# Patient Record
Sex: FEMALE | Race: WHITE | NOT HISPANIC OR LATINO | URBAN - METROPOLITAN AREA
[De-identification: names, ages, dates, MRNs, and addresses within clinical notes are randomized per-mention and may not be internally consistent; named-entity substitution may affect disease eponyms.]

---

## 2023-04-07 ENCOUNTER — APPOINTMENT (OUTPATIENT)
Dept: PRIMARY CARE | Facility: CLINIC | Age: 58
End: 2023-04-07
Payer: COMMERCIAL

## 2023-04-07 PROBLEM — N63.20 LEFT BREAST MASS: Status: ACTIVE | Noted: 2023-04-07

## 2023-04-07 PROBLEM — J45.909 ASTHMA (HHS-HCC): Status: ACTIVE | Noted: 2023-04-07

## 2023-04-07 PROBLEM — M54.9 BACK PAIN: Status: ACTIVE | Noted: 2023-04-07

## 2023-04-07 RX ORDER — CYCLOBENZAPRINE HCL 5 MG
5 TABLET ORAL NIGHTLY PRN
COMMUNITY
Start: 2022-12-21 | End: 2023-12-22 | Stop reason: SDUPTHER

## 2023-04-28 ENCOUNTER — CLINICAL SUPPORT (OUTPATIENT)
Dept: PRIMARY CARE | Facility: CLINIC | Age: 58
End: 2023-04-28
Payer: COMMERCIAL

## 2023-04-28 DIAGNOSIS — Z23 IMMUNIZATION DUE: ICD-10-CM

## 2023-04-28 PROCEDURE — 90750 HZV VACC RECOMBINANT IM: CPT | Performed by: INTERNAL MEDICINE

## 2023-04-28 PROCEDURE — 90471 IMMUNIZATION ADMIN: CPT | Performed by: INTERNAL MEDICINE

## 2023-12-22 ENCOUNTER — OFFICE VISIT (OUTPATIENT)
Dept: PRIMARY CARE | Facility: CLINIC | Age: 58
End: 2023-12-22
Payer: COMMERCIAL

## 2023-12-22 VITALS
WEIGHT: 170 LBS | OXYGEN SATURATION: 100 % | DIASTOLIC BLOOD PRESSURE: 72 MMHG | TEMPERATURE: 97.8 F | BODY MASS INDEX: 28.32 KG/M2 | SYSTOLIC BLOOD PRESSURE: 106 MMHG | HEART RATE: 68 BPM | HEIGHT: 65 IN | RESPIRATION RATE: 14 BRPM

## 2023-12-22 DIAGNOSIS — F43.9 STRESS: ICD-10-CM

## 2023-12-22 DIAGNOSIS — M54.50 CHRONIC LOW BACK PAIN, UNSPECIFIED BACK PAIN LATERALITY, UNSPECIFIED WHETHER SCIATICA PRESENT: Primary | ICD-10-CM

## 2023-12-22 DIAGNOSIS — Z01.419 WELL FEMALE EXAM WITH ROUTINE GYNECOLOGICAL EXAM: ICD-10-CM

## 2023-12-22 DIAGNOSIS — G89.29 CHRONIC LOW BACK PAIN, UNSPECIFIED BACK PAIN LATERALITY, UNSPECIFIED WHETHER SCIATICA PRESENT: Primary | ICD-10-CM

## 2023-12-22 DIAGNOSIS — Z12.31 BREAST CANCER SCREENING BY MAMMOGRAM: ICD-10-CM

## 2023-12-22 DIAGNOSIS — J45.20 MILD INTERMITTENT ASTHMA WITHOUT COMPLICATION (HHS-HCC): ICD-10-CM

## 2023-12-22 DIAGNOSIS — Z00.00 HEALTHCARE MAINTENANCE: ICD-10-CM

## 2023-12-22 DIAGNOSIS — Z12.11 COLON CANCER SCREENING: ICD-10-CM

## 2023-12-22 PROCEDURE — 1036F TOBACCO NON-USER: CPT | Performed by: INTERNAL MEDICINE

## 2023-12-22 PROCEDURE — 99396 PREV VISIT EST AGE 40-64: CPT | Performed by: INTERNAL MEDICINE

## 2023-12-22 PROCEDURE — 93000 ELECTROCARDIOGRAM COMPLETE: CPT | Performed by: INTERNAL MEDICINE

## 2023-12-22 RX ORDER — ALBUTEROL SULFATE 90 UG/1
2 AEROSOL, METERED RESPIRATORY (INHALATION) EVERY 6 HOURS PRN
Qty: 18 G | Refills: 11 | Status: SHIPPED | OUTPATIENT
Start: 2023-12-22 | End: 2024-12-21

## 2023-12-22 RX ORDER — CYCLOBENZAPRINE HCL 5 MG
5 TABLET ORAL NIGHTLY
Qty: 30 TABLET | Refills: 0 | Status: SHIPPED | OUTPATIENT
Start: 2023-12-22 | End: 2024-01-21

## 2023-12-22 RX ORDER — HYDROXYZINE HYDROCHLORIDE 10 MG/1
10 TABLET, FILM COATED ORAL EVERY 6 HOURS PRN
Qty: 30 TABLET | Refills: 1 | Status: SHIPPED | OUTPATIENT
Start: 2023-12-22 | End: 2024-02-20

## 2023-12-22 ASSESSMENT — ENCOUNTER SYMPTOMS
SLEEP DISTURBANCE: 0
CHEST TIGHTNESS: 0
ALLERGIC/IMMUNOLOGIC NEGATIVE: 1
COUGH: 0
WHEEZING: 0
UNEXPECTED WEIGHT CHANGE: 0
CONSTIPATION: 0
DEPRESSION: 0
ABDOMINAL PAIN: 0
CARDIOVASCULAR NEGATIVE: 1
JOINT SWELLING: 0
DIFFICULTY URINATING: 0
EYES NEGATIVE: 1
DIZZINESS: 0
EYE DISCHARGE: 0
HEADACHES: 0
VOICE CHANGE: 0
DYSURIA: 0
SPEECH DIFFICULTY: 0
SORE THROAT: 0
ACTIVITY CHANGE: 0
SINUS PRESSURE: 0
HEMATOLOGIC/LYMPHATIC NEGATIVE: 1
CONFUSION: 0
POLYDIPSIA: 0
OCCASIONAL FEELINGS OF UNSTEADINESS: 0
ARTHRALGIAS: 0
MUSCULOSKELETAL NEGATIVE: 1
WOUND: 0
STRIDOR: 0
BLOOD IN STOOL: 0
PALPITATIONS: 0
ADENOPATHY: 0
AGITATION: 0
APPETITE CHANGE: 0
COLOR CHANGE: 0
WEAKNESS: 0
EYE REDNESS: 0
DIARRHEA: 0
FLANK PAIN: 0
SHORTNESS OF BREATH: 0
EYE PAIN: 0
TREMORS: 0
LOSS OF SENSATION IN FEET: 0
NUMBNESS: 0
LIGHT-HEADEDNESS: 0
VOMITING: 0
PSYCHIATRIC NEGATIVE: 1
CONSTITUTIONAL NEGATIVE: 1
NECK PAIN: 0
BRUISES/BLEEDS EASILY: 0
MYALGIAS: 0
GASTROINTESTINAL NEGATIVE: 1
NAUSEA: 0
ENDOCRINE NEGATIVE: 1
SINUS PAIN: 0
TROUBLE SWALLOWING: 0
POLYPHAGIA: 0
RESPIRATORY NEGATIVE: 1
FACIAL ASYMMETRY: 0
NEUROLOGICAL NEGATIVE: 1
SEIZURES: 0
NERVOUS/ANXIOUS: 0
NECK STIFFNESS: 0
BACK PAIN: 0
FREQUENCY: 0
HALLUCINATIONS: 0

## 2023-12-22 ASSESSMENT — PATIENT HEALTH QUESTIONNAIRE - PHQ9
1. LITTLE INTEREST OR PLEASURE IN DOING THINGS: NOT AT ALL
2. FEELING DOWN, DEPRESSED OR HOPELESS: NOT AT ALL
SUM OF ALL RESPONSES TO PHQ9 QUESTIONS 1 AND 2: 0

## 2023-12-22 NOTE — PROGRESS NOTES
Subjective   Patient ID: Tere Woods is a 58 y.o. female who presents for Annual Exam (Patient is here for a physical. ).  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    Not taking any prescription medications on regular basis.     Requests refills on her muscle relaxant and inhaler which she takes occasionally.  Noted her SBP going up to 140 in stressful situations, will try Hydroxyzine prn.    We reviewed and discussed details of  blood work: CBC, CMP, TSH, Lipid panel done in 2022. Results within acceptable range.    Last mammogram: 1/23  PAP: due  Colonoscopy: due    Review of Systems   Constitutional: Negative.  Negative for activity change, appetite change and unexpected weight change.   HENT: Negative.  Negative for congestion, ear discharge, ear pain, hearing loss, mouth sores, nosebleeds, sinus pressure, sinus pain, sore throat, trouble swallowing and voice change.    Eyes: Negative.  Negative for pain, discharge, redness and visual disturbance.   Respiratory: Negative.  Negative for cough, chest tightness, shortness of breath, wheezing and stridor.    Cardiovascular: Negative.  Negative for chest pain, palpitations and leg swelling.   Gastrointestinal: Negative.  Negative for abdominal pain, blood in stool, constipation, diarrhea, nausea and vomiting.   Endocrine: Negative.  Negative for polydipsia, polyphagia and polyuria.   Genitourinary: Negative.  Negative for difficulty urinating, dysuria, flank pain, frequency and urgency.   Musculoskeletal: Negative.  Negative for arthralgias, back pain, gait problem, joint swelling, myalgias, neck pain and neck stiffness.   Skin: Negative.  Negative for color change, rash and wound.   Allergic/Immunologic: Negative.  Negative for environmental allergies, food allergies and immunocompromised state.   Neurological: Negative.  Negative for dizziness, tremors, seizures, syncope, facial asymmetry, speech difficulty, weakness, light-headedness, numbness and  "headaches.   Hematological: Negative.  Negative for adenopathy. Does not bruise/bleed easily.   Psychiatric/Behavioral: Negative.  Negative for agitation, behavioral problems, confusion, hallucinations, sleep disturbance and suicidal ideas. The patient is not nervous/anxious.    All other systems reviewed and are negative.      Objective     Review of systems was performed and all systems were negative except what in HPI    /72   Pulse 68   Temp 36.6 °C (97.8 °F)   Resp 14   Ht 1.651 m (5' 5\")   Wt 77.1 kg (170 lb)   SpO2 100%   BMI 28.29 kg/m²    Physical Exam  Vitals and nursing note reviewed.   Constitutional:       General: She is not in acute distress.     Appearance: Normal appearance.   HENT:      Head: Normocephalic and atraumatic.      Right Ear: Tympanic membrane, ear canal and external ear normal.      Left Ear: Tympanic membrane, ear canal and external ear normal.      Nose: Nose normal. No congestion or rhinorrhea.      Mouth/Throat:      Mouth: Mucous membranes are moist.      Pharynx: Oropharynx is clear.   Eyes:      General:         Right eye: No discharge.         Left eye: No discharge.      Extraocular Movements: Extraocular movements intact.      Conjunctiva/sclera: Conjunctivae normal.      Pupils: Pupils are equal, round, and reactive to light.   Cardiovascular:      Rate and Rhythm: Normal rate and regular rhythm.      Pulses: Normal pulses.      Heart sounds: Normal heart sounds. No murmur heard.     No friction rub. No gallop.   Pulmonary:      Effort: Pulmonary effort is normal. No respiratory distress.      Breath sounds: Normal breath sounds. No stridor. No wheezing, rhonchi or rales.   Chest:      Chest wall: No tenderness.   Abdominal:      General: Bowel sounds are normal.      Palpations: Abdomen is soft. There is no mass.      Tenderness: There is no abdominal tenderness. There is no guarding or rebound.   Musculoskeletal:         General: No swelling or deformity. " Normal range of motion.      Cervical back: Normal range of motion and neck supple. No rigidity or tenderness.      Right lower leg: No edema.      Left lower leg: No edema.   Lymphadenopathy:      Cervical: No cervical adenopathy.   Skin:     General: Skin is warm and dry.      Coloration: Skin is not jaundiced.      Findings: No bruising or erythema.   Neurological:      General: No focal deficit present.      Mental Status: She is alert and oriented to person, place, and time. Mental status is at baseline.      Cranial Nerves: No cranial nerve deficit.      Motor: No weakness.      Coordination: Coordination normal.      Gait: Gait normal.   Psychiatric:         Mood and Affect: Mood normal.         Behavior: Behavior normal.         Thought Content: Thought content normal.         Judgment: Judgment normal.         Assessment/Plan   Problem List Items Addressed This Visit             ICD-10-CM       Musculoskeletal and Injuries    Back pain - Primary M54.9    Relevant Medications    cyclobenzaprine (Flexeril) 5 mg tablet       Pulmonary and Pneumonias    Asthma J45.909    Relevant Medications    albuterol 90 mcg/actuation inhaler     Other Visit Diagnoses         Codes    Healthcare maintenance     Z00.00    Relevant Orders    ECG 12 lead (Clinic Performed) (Completed)    CBC    Comprehensive Metabolic Panel    Lipid Panel    TSH with reflex to Free T4 if abnormal    Vitamin D 25-Hydroxy,Total (for eval of Vitamin D levels)    Urinalysis with Reflex Microscopic    Stress     F43.9    Relevant Medications    hydrOXYzine HCL (Atarax) 10 mg tablet    Breast cancer screening by mammogram     Z12.31    Relevant Orders    BI mammo bilateral screening tomosynthesis    Well female exam with routine gynecological exam     Z01.419    Relevant Orders    Referral to Gynecology    Colon cancer screening     Z12.11    Relevant Orders    Colonoscopy Screening; Average Risk Patient          It was a pleasure to see you  today.  I would like to remind you about importance of a healthy lifestyle in order to improve your well-being and live longer.  Try to engage in physical activities for at least 150 minutes per week.  Eat about 10 servings of fruits and vegetables daily. My advice is 2 servings of fruits and 8 servings of vegetables.  For vegetables choose at least half of them green and at least half of them fresh.  Please avoid sugar, salt, fried food and saturated fat.      F/up in 1 year or sooner if needed.

## 2024-01-29 ENCOUNTER — LAB (OUTPATIENT)
Dept: LAB | Facility: LAB | Age: 59
End: 2024-01-29
Payer: COMMERCIAL

## 2024-01-29 DIAGNOSIS — Z00.00 HEALTHCARE MAINTENANCE: ICD-10-CM

## 2024-01-29 LAB
25(OH)D3 SERPL-MCNC: 37 NG/ML (ref 30–100)
ALBUMIN SERPL BCP-MCNC: 4 G/DL (ref 3.4–5)
ALP SERPL-CCNC: 45 U/L (ref 33–110)
ALT SERPL W P-5'-P-CCNC: 12 U/L (ref 7–45)
ANION GAP SERPL CALC-SCNC: 12 MMOL/L (ref 10–20)
APPEARANCE UR: CLEAR
AST SERPL W P-5'-P-CCNC: 15 U/L (ref 9–39)
BACTERIA #/AREA URNS AUTO: ABNORMAL /HPF
BILIRUB SERPL-MCNC: 0.5 MG/DL (ref 0–1.2)
BILIRUB UR STRIP.AUTO-MCNC: NEGATIVE MG/DL
BUN SERPL-MCNC: 15 MG/DL (ref 6–23)
CALCIUM SERPL-MCNC: 8.5 MG/DL (ref 8.6–10.3)
CHLORIDE SERPL-SCNC: 108 MMOL/L (ref 98–107)
CHOLEST SERPL-MCNC: 183 MG/DL (ref 0–199)
CHOLESTEROL/HDL RATIO: 2.4
CO2 SERPL-SCNC: 25 MMOL/L (ref 21–32)
COLOR UR: ABNORMAL
CREAT SERPL-MCNC: 0.76 MG/DL (ref 0.5–1.05)
EGFRCR SERPLBLD CKD-EPI 2021: >90 ML/MIN/1.73M*2
ERYTHROCYTE [DISTWIDTH] IN BLOOD BY AUTOMATED COUNT: 12.7 % (ref 11.5–14.5)
GLUCOSE SERPL-MCNC: 87 MG/DL (ref 74–99)
GLUCOSE UR STRIP.AUTO-MCNC: NEGATIVE MG/DL
HCT VFR BLD AUTO: 40.3 % (ref 36–46)
HDLC SERPL-MCNC: 74.9 MG/DL
HGB BLD-MCNC: 13 G/DL (ref 12–16)
KETONES UR STRIP.AUTO-MCNC: NEGATIVE MG/DL
LDLC SERPL CALC-MCNC: 99 MG/DL
LEUKOCYTE ESTERASE UR QL STRIP.AUTO: ABNORMAL
MCH RBC QN AUTO: 30.8 PG (ref 26–34)
MCHC RBC AUTO-ENTMCNC: 32.3 G/DL (ref 32–36)
MCV RBC AUTO: 96 FL (ref 80–100)
MUCOUS THREADS #/AREA URNS AUTO: ABNORMAL /LPF
NITRITE UR QL STRIP.AUTO: NEGATIVE
NON HDL CHOLESTEROL: 108 MG/DL (ref 0–149)
NRBC BLD-RTO: 0 /100 WBCS (ref 0–0)
PH UR STRIP.AUTO: 5 [PH]
PLATELET # BLD AUTO: 340 X10*3/UL (ref 150–450)
POTASSIUM SERPL-SCNC: 4.2 MMOL/L (ref 3.5–5.3)
PROT SERPL-MCNC: 6.8 G/DL (ref 6.4–8.2)
PROT UR STRIP.AUTO-MCNC: NEGATIVE MG/DL
RBC # BLD AUTO: 4.22 X10*6/UL (ref 4–5.2)
RBC # UR STRIP.AUTO: NEGATIVE /UL
RBC #/AREA URNS AUTO: ABNORMAL /HPF
SODIUM SERPL-SCNC: 141 MMOL/L (ref 136–145)
SP GR UR STRIP.AUTO: 1.01
SQUAMOUS #/AREA URNS AUTO: ABNORMAL /HPF
TRIGL SERPL-MCNC: 44 MG/DL (ref 0–149)
TSH SERPL-ACNC: 2.8 MIU/L (ref 0.44–3.98)
UROBILINOGEN UR STRIP.AUTO-MCNC: <2 MG/DL
VLDL: 9 MG/DL (ref 0–40)
WBC # BLD AUTO: 3.9 X10*3/UL (ref 4.4–11.3)
WBC #/AREA URNS AUTO: ABNORMAL /HPF

## 2024-01-29 PROCEDURE — 85027 COMPLETE CBC AUTOMATED: CPT

## 2024-01-29 PROCEDURE — 36415 COLL VENOUS BLD VENIPUNCTURE: CPT

## 2024-01-29 PROCEDURE — 81001 URINALYSIS AUTO W/SCOPE: CPT

## 2024-01-29 PROCEDURE — 84443 ASSAY THYROID STIM HORMONE: CPT

## 2024-01-29 PROCEDURE — 80053 COMPREHEN METABOLIC PANEL: CPT

## 2024-01-29 PROCEDURE — 82306 VITAMIN D 25 HYDROXY: CPT

## 2024-01-29 PROCEDURE — 80061 LIPID PANEL: CPT

## 2024-02-16 ENCOUNTER — HOSPITAL ENCOUNTER (OUTPATIENT)
Dept: RADIOLOGY | Facility: HOSPITAL | Age: 59
Discharge: HOME | End: 2024-02-16
Payer: COMMERCIAL

## 2024-02-16 ENCOUNTER — APPOINTMENT (OUTPATIENT)
Dept: RADIOLOGY | Facility: CLINIC | Age: 59
End: 2024-02-16
Payer: COMMERCIAL

## 2024-02-16 DIAGNOSIS — Z12.31 BREAST CANCER SCREENING BY MAMMOGRAM: ICD-10-CM

## 2024-02-16 PROCEDURE — 77067 SCR MAMMO BI INCL CAD: CPT

## 2024-02-16 PROCEDURE — 77067 SCR MAMMO BI INCL CAD: CPT | Performed by: RADIOLOGY

## 2024-02-16 PROCEDURE — 77063 BREAST TOMOSYNTHESIS BI: CPT | Performed by: RADIOLOGY

## 2024-02-27 DIAGNOSIS — Z12.11 COLON CANCER SCREENING: ICD-10-CM

## 2024-02-28 RX ORDER — SODIUM, POTASSIUM,MAG SULFATES 17.5-3.13G
1 SOLUTION, RECONSTITUTED, ORAL ORAL EVERY 12 HOURS
Qty: 2 EACH | Refills: 0 | Status: SHIPPED | OUTPATIENT
Start: 2024-02-28 | End: 2024-04-22 | Stop reason: ALTCHOICE

## 2024-04-04 ENCOUNTER — ANESTHESIA EVENT (OUTPATIENT)
Dept: GASTROENTEROLOGY | Facility: EXTERNAL LOCATION | Age: 59
End: 2024-04-04

## 2024-04-12 ENCOUNTER — ANESTHESIA (OUTPATIENT)
Dept: GASTROENTEROLOGY | Facility: EXTERNAL LOCATION | Age: 59
End: 2024-04-12

## 2024-04-12 ENCOUNTER — HOSPITAL ENCOUNTER (OUTPATIENT)
Dept: GASTROENTEROLOGY | Facility: EXTERNAL LOCATION | Age: 59
Discharge: HOME | End: 2024-04-12
Payer: COMMERCIAL

## 2024-04-12 VITALS
OXYGEN SATURATION: 98 % | HEART RATE: 62 BPM | DIASTOLIC BLOOD PRESSURE: 71 MMHG | TEMPERATURE: 98.1 F | RESPIRATION RATE: 12 BRPM | SYSTOLIC BLOOD PRESSURE: 103 MMHG

## 2024-04-12 DIAGNOSIS — Z12.11 COLON CANCER SCREENING: ICD-10-CM

## 2024-04-12 PROCEDURE — 45378 DIAGNOSTIC COLONOSCOPY: CPT | Performed by: INTERNAL MEDICINE

## 2024-04-12 RX ORDER — PROPOFOL 10 MG/ML
INJECTION, EMULSION INTRAVENOUS AS NEEDED
Status: DISCONTINUED | OUTPATIENT
Start: 2024-04-12 | End: 2024-04-12

## 2024-04-12 RX ORDER — SODIUM CHLORIDE 9 MG/ML
20 INJECTION, SOLUTION INTRAVENOUS CONTINUOUS
Status: DISCONTINUED | OUTPATIENT
Start: 2024-04-12 | End: 2024-04-13 | Stop reason: HOSPADM

## 2024-04-12 RX ORDER — ONDANSETRON HYDROCHLORIDE 2 MG/ML
4 INJECTION, SOLUTION INTRAVENOUS ONCE AS NEEDED
Status: DISCONTINUED | OUTPATIENT
Start: 2024-04-12 | End: 2024-04-13 | Stop reason: HOSPADM

## 2024-04-12 RX ADMIN — SODIUM CHLORIDE: 9 INJECTION, SOLUTION INTRAVENOUS at 09:39

## 2024-04-12 RX ADMIN — PROPOFOL 50 MCG: 10 INJECTION, EMULSION INTRAVENOUS at 09:45

## 2024-04-12 RX ADMIN — PROPOFOL 50 MCG: 10 INJECTION, EMULSION INTRAVENOUS at 09:40

## 2024-04-12 RX ADMIN — PROPOFOL 50 MCG: 10 INJECTION, EMULSION INTRAVENOUS at 09:43

## 2024-04-12 RX ADMIN — PROPOFOL 50 MCG: 10 INJECTION, EMULSION INTRAVENOUS at 09:47

## 2024-04-12 SDOH — HEALTH STABILITY: MENTAL HEALTH: CURRENT SMOKER: 0

## 2024-04-12 ASSESSMENT — PAIN SCALES - GENERAL
PAINLEVEL_OUTOF10: 0 - NO PAIN
PAIN_LEVEL: 0
PAINLEVEL_OUTOF10: 0 - NO PAIN

## 2024-04-12 ASSESSMENT — COLUMBIA-SUICIDE SEVERITY RATING SCALE - C-SSRS
1. IN THE PAST MONTH, HAVE YOU WISHED YOU WERE DEAD OR WISHED YOU COULD GO TO SLEEP AND NOT WAKE UP?: NO
6. HAVE YOU EVER DONE ANYTHING, STARTED TO DO ANYTHING, OR PREPARED TO DO ANYTHING TO END YOUR LIFE?: NO
2. HAVE YOU ACTUALLY HAD ANY THOUGHTS OF KILLING YOURSELF?: NO

## 2024-04-12 ASSESSMENT — PAIN - FUNCTIONAL ASSESSMENT
PAIN_FUNCTIONAL_ASSESSMENT: 0-10

## 2024-04-12 NOTE — H&P
Outpatient Hospital Procedure    Patient Profile-Procedures  Initial Info  Patient Demographics  Name Tere Woods  Date of Birth 1965  MRN [id number]  Address   [address] [id number] [facility] [address]    Primary Phone Number [phone]  Secondary Phone Number    PCP Johana James    Procedures   Colonoscopy      Indication:  surveillance    Primary contact name and number   Extended Emergency Contact Information  Primary Emergency Contact: birgit teixeira  Mobile Phone: [phone]  Relation: Spouse  Preferred language: English   needed? No    General Health  Weight There were no vitals filed for this visit.  BMI There is no height or weight on file to calculate BMI.    Allergies  Allergies   Allergen Reactions    Penicillins Rash       Past Medical History   History reviewed. No pertinent past medical history.    Provider assessment  Diagnosis  Medication Reviewed - yes  Prior to Admission medications    Medication Sig Start Date End Date Taking? Authorizing Provider   albuterol 90 mcg/actuation inhaler Inhale 2 puffs every 6 hours if needed for wheezing. 12/22/23 12/21/24 Yes Johana James MD PhD   sodium,potassium,mag sulfates (Suprep Bowel Prep Kit) 17.5-3.13-1.6 gram recon soln solution Take 1 bottle by mouth every 12 hours. 2/28/24  Yes Maryuri BRYANT MD   hydrOXYzine HCL (Atarax) 10 mg tablet Take 1 tablet (10 mg) by mouth every 6 hours if needed for itching or anxiety. 12/22/23 2/20/24  Johana James MD PhD       This is my H&P    Physical Exam  Physical Exam  Constitutional:       Comments: Awake   HENT:      Head: Normocephalic.   Cardiovascular:      Rate and Rhythm: Normal rate and regular rhythm.   Pulmonary:      Effort: Pulmonary effort is normal.      Breath sounds: Normal breath sounds.   Abdominal:      General: Bowel sounds are normal.      Palpations: Abdomen is soft.   Neurological:      Mental Status: She is alert.    Psychiatric:         Mood and Affect: Mood normal.           Oropharyngeal Classification II (hard and soft palate, upper portion of tonsils anduvula visible)  ASA PS Classification 2  Sedation Plan Deep  Procedure Plan - pre-procedural (re)assesment completed by physician:  discharge/transfer patient when discharge criteria met    Maryuri Valderrama MD  4/12/2024 9:36 AM

## 2024-04-12 NOTE — ANESTHESIA PREPROCEDURE EVALUATION
Patient: Tere Woods    Procedure Information       Date/Time: 04/12/24 0930    Scheduled providers: Maryuri BRYANT MD    Procedure: COLONOSCOPY    Location: [facility]            Relevant Problems   Pulmonary   (+) Asthma (HHS-HCC)       Clinical information reviewed:   Tobacco  Allergies  Meds   Med Hx  Surg Hx  OB Status  Fam Hx  Soc   Hx        NPO Detail:  NPO/Void Status  Date of Last Liquid: 04/12/24  Time of Last Liquid: 0430  Date of Last Solid: 04/10/24  Time of Last Solid: 1900         Physical Exam    Airway  Mallampati: II  TM distance: >3 FB  Neck ROM: full     Cardiovascular - normal exam     Dental - normal exam     Pulmonary - normal exam     Abdominal - normal exam             Anesthesia Plan    History of general anesthesia?: no  History of complications of general anesthesia?: yes    ASA 2     MAC     The patient is not a current smoker.  Patient was previously instructed to abstain from smoking on day of procedure.  Patient did not smoke on day of procedure.    intravenous induction   Anesthetic plan and risks discussed with patient.

## 2024-04-12 NOTE — DISCHARGE INSTRUCTIONS
Patient Instructions Post Endoscopy Procedure      The anesthetics, sedatives or narcotics which were given to you today will be acting in your body for the next 24 hours, so you might feel a little sleepy or groggy.  This feeling should slowly wear off. Carefully read and follow the instructions.     You received sedation today:  - Do not drive or operate any machinery or power tools of any kind.   - No alcoholic beverages today, not even beer or wine.  - Do not make any important decisions or sign any legal documents.  - No over the counter medications that contain alcohol or that may cause drowsiness.    While it is common to experience mild to moderate abdominal distention, gas, or belching after your procedure, if any of these symptoms occur following discharge from the GI Lab or within one week of having your procedure, call the [facility] to be advised whether a visit to your nearest Urgent Care or Emergency Department is indicated.  Take this paper with you if you go.   - If you develop an allergic reaction to the medications that were given during your procedure such as difficulty breathing, rash, hives, severe nausea, vomiting or lightheadedness.  - If you experience chest pain, shortness of breath, severe abdominal pain, fevers and chills.  -If you develop signs and symptoms of bleeding such as blood in your spit, if your stools turn black, tarry, or bloody  - If you have not urinated within 8 hours following your procedure.  - If your IV site becomes painful, red, inflamed, or looks infected.    If you received a biopsy/polypectomy the following instructions apply below:  __ Do not use non-steroidal medications or anti-coagulants for one week following your procedure. (Examples of these types of medications are: Advil, Arthrotec, Aleve, Coumadin, Ecotrin, Heparin, Ibuprofen, Indocin, Motrin, Naprosyn, Nuprin, Plavix, Vioxx, and Voltarin, or their generic forms.  This list is not  all-inclusive.  Check with your physician or pharmacist before resuming medications.)   __ Eat a soft diet today.  Avoid foods that are poorly digested for the next 24 hours.  These foods would include: nuts, beans, lettuce, red meats, and fried foods. Start with liquids and advance your diet as tolerated, gradually work up to eating solids.   __ You can restart your ASA tomorrow  __ You can resume your anticoagulation therapy -     Your physician recommends the additional following instructions:    -You have a contact number available for emergencies. The signs and symptoms of potential delayed complications were discussed with you. You may return to normal activities tomorrow.  -Resume your previous diet or other if specified.  -Continue your present medications.   -We are waiting for your pathology results, if applicable. The results will be available in [employer]. I will send you a message with any recommendations.  -The findings and recommendations have been discussed with you and/or family.  -Please see Medication Reconciliation Form for new medication/medications prescribed.     If you experience any problems or have any questions following discharge from the GI Lab, please call: [phone] from 7 am- 4:30 pm.  In the event of an emergency please go to the closest Emergency Department or call Dr. Valderrama at [phone]

## 2024-04-12 NOTE — ANESTHESIA POSTPROCEDURE EVALUATION
Patient: Tere Woods    Procedure Summary       Date: 04/12/24 Room / Location: [facility]    Anesthesia Start: 0939 Anesthesia Stop: 0959    Procedure: COLONOSCOPY Diagnosis: Colon cancer screening    Scheduled Providers: Maryuri BRYANT MD Responsible Provider: JOLENE Humphrey    Anesthesia Type: MAC ASA Status: 2            Anesthesia Type: MAC    Vitals Value Taken Time   /67 04/12/24 1006   Temp 36.7 °C (98.1 °F) 04/12/24 0956   Pulse 7 04/12/24 1006   Resp 18 04/12/24 1006   SpO2 98 % 04/12/24 1006       Anesthesia Post Evaluation    Patient location during evaluation: bedside  Patient participation: complete - patient participated  Level of consciousness: awake  Pain score: 0  Pain management: adequate  Airway patency: patent  Cardiovascular status: acceptable  Respiratory status: acceptable  Hydration status: acceptable  Postoperative Nausea and Vomiting: none        No notable events documented.

## 2024-04-15 NOTE — ADDENDUM NOTE
Encounter addended by: Eve Clifton RN on: 4/15/2024 7:56 AM   Actions taken: Contacts section saved, Flowsheet accepted

## 2024-04-22 ENCOUNTER — OFFICE VISIT (OUTPATIENT)
Dept: OBSTETRICS AND GYNECOLOGY | Facility: CLINIC | Age: 59
End: 2024-04-22
Payer: COMMERCIAL

## 2024-04-22 VITALS
DIASTOLIC BLOOD PRESSURE: 72 MMHG | SYSTOLIC BLOOD PRESSURE: 124 MMHG | BODY MASS INDEX: 28.95 KG/M2 | WEIGHT: 169.6 LBS | HEIGHT: 64 IN

## 2024-04-22 DIAGNOSIS — Z01.419 WELL FEMALE EXAM WITH ROUTINE GYNECOLOGICAL EXAM: ICD-10-CM

## 2024-04-22 DIAGNOSIS — Z12.31 BREAST CANCER SCREENING BY MAMMOGRAM: ICD-10-CM

## 2024-04-22 PROCEDURE — 87624 HPV HI-RISK TYP POOLED RSLT: CPT

## 2024-04-22 PROCEDURE — 88175 CYTOPATH C/V AUTO FLUID REDO: CPT

## 2024-04-22 PROCEDURE — 99386 PREV VISIT NEW AGE 40-64: CPT | Performed by: OBSTETRICS & GYNECOLOGY

## 2024-04-22 RX ORDER — FERROUS SULFATE, DRIED 160(50) MG
1 TABLET, EXTENDED RELEASE ORAL DAILY
COMMUNITY

## 2024-04-22 ASSESSMENT — ENCOUNTER SYMPTOMS
LOSS OF SENSATION IN FEET: 0
DEPRESSION: 0
OCCASIONAL FEELINGS OF UNSTEADINESS: 0

## 2024-04-22 ASSESSMENT — PATIENT HEALTH QUESTIONNAIRE - PHQ9
SUM OF ALL RESPONSES TO PHQ9 QUESTIONS 1 AND 2: 0
1. LITTLE INTEREST OR PLEASURE IN DOING THINGS: NOT AT ALL
2. FEELING DOWN, DEPRESSED OR HOPELESS: NOT AT ALL

## 2024-04-22 NOTE — PROGRESS NOTES
Subjective   Patient ID: Tere Woods is a 58 y.o. female who presents for Gynecologic Exam.      HPI  Wants annual exam today moved here from [place]   Had all normal PAPs, Had 1 csection.   Had IUD then when removed 5 yrs ago, no menses.    Had normal mammo 2/2024. Last PAP 2021- normal  Colonoscopy normal     Review of Systems  Neg   Objective   Physical Exam  Physical Exam         Appearance: Normal appearance. Affect normal and alert  Pulmonary:      Effort: Pulmonary effort is normal. Breath sounds clear  Skin: no rashes or lesions   Breasts:     Breasts bilaterally are symmetrical. No masses or axillary adenopathy. No skin or nipple changes    Abdominal:     Abdomen is flat, soft, nontender. No distension. No mass palpated.      Genitourinary:     Labia: no skin lesions or rash       Urethra: No lesions.      Bladder with no prolapse     Vagina: No discharge, mucosa is pink with no lesions.     Cervix:    mobile and nontender no discharge     Uterus:   Not enlarged, small, nontender.      Adnexa: Bilaterally with  No mass or tenderness.            Extremities:  Nontender, no edema. Normal range of motion    Assessment/Plan   Diagnoses and all orders for this visit:  Well female exam with routine gynecological exam    -     THINPREP PAP TEST  Breast cancer screening by mammogram  -     BI mammo bilateral screening tomosynthesis; Future         Deena Soares MD 04/22/24 1:50 PM

## 2024-05-06 LAB
CYTOLOGY CMNT CVX/VAG CYTO-IMP: NORMAL
HPV HR 12 DNA GENITAL QL NAA+PROBE: NEGATIVE
HPV HR GENOTYPES PNL CVX NAA+PROBE: NEGATIVE
HPV16 DNA SPEC QL NAA+PROBE: NEGATIVE
HPV18 DNA SPEC QL NAA+PROBE: NEGATIVE
LAB AP HPV GENOTYPE QUESTION: YES
LAB AP HPV HR: NORMAL
LABORATORY COMMENT REPORT: NORMAL
PATH REPORT.TOTAL CANCER: NORMAL

## 2024-08-26 ENCOUNTER — OFFICE VISIT (OUTPATIENT)
Dept: ORTHOPEDIC SURGERY | Facility: HOSPITAL | Age: 59
End: 2024-08-26
Payer: COMMERCIAL

## 2024-08-26 ENCOUNTER — HOSPITAL ENCOUNTER (OUTPATIENT)
Dept: RADIOLOGY | Facility: HOSPITAL | Age: 59
Discharge: HOME | End: 2024-08-26
Payer: COMMERCIAL

## 2024-08-26 VITALS — WEIGHT: 170 LBS | BODY MASS INDEX: 28.32 KG/M2 | HEIGHT: 65 IN

## 2024-08-26 DIAGNOSIS — M25.561 CHRONIC PAIN OF RIGHT KNEE: ICD-10-CM

## 2024-08-26 DIAGNOSIS — G89.29 CHRONIC PAIN OF RIGHT KNEE: ICD-10-CM

## 2024-08-26 DIAGNOSIS — S83.241A ACUTE MEDIAL MENISCUS TEAR, RIGHT, INITIAL ENCOUNTER: ICD-10-CM

## 2024-08-26 PROCEDURE — 3008F BODY MASS INDEX DOCD: CPT | Performed by: ORTHOPAEDIC SURGERY

## 2024-08-26 PROCEDURE — 99213 OFFICE O/P EST LOW 20 MIN: CPT | Performed by: ORTHOPAEDIC SURGERY

## 2024-08-26 PROCEDURE — 73564 X-RAY EXAM KNEE 4 OR MORE: CPT | Mod: RT

## 2024-08-26 PROCEDURE — 1036F TOBACCO NON-USER: CPT | Performed by: ORTHOPAEDIC SURGERY

## 2024-08-26 PROCEDURE — 99203 OFFICE O/P NEW LOW 30 MIN: CPT | Performed by: ORTHOPAEDIC SURGERY

## 2024-08-26 PROCEDURE — 73564 X-RAY EXAM KNEE 4 OR MORE: CPT | Mod: RIGHT SIDE | Performed by: RADIOLOGY

## 2024-08-26 ASSESSMENT — PAIN - FUNCTIONAL ASSESSMENT: PAIN_FUNCTIONAL_ASSESSMENT: 0-10

## 2024-08-26 ASSESSMENT — PAIN SCALES - GENERAL: PAINLEVEL_OUTOF10: 5 - MODERATE PAIN

## 2024-08-26 NOTE — PROGRESS NOTES
PRIMARY CARE PHYSICIAN: Johana James MD PhD  REFERRING PROVIDER: No referring provider defined for this encounter.     CONSULT ORTHOPAEDIC: Knee Evaluation      SUBJECTIVE  CHIEF COMPLAINT: R    HPI: Tere Woods is a 59 y.o. patient presenting for a new patient evaluation. Tere Woods complains of right knee pain.  1 month ago the patient reports a fall onto the right knee.  She reports pain and swelling in the knee since that time.  She reports occasional feelings of instability.  She states the knee will occasionally lock, especially after standing for a long period of time.  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  She has been taking NSAIDs with some relief.  She has never had any injections.  She has been told that she has arthritis in this knee, but reports an acute change after this injury.  She denies any numbness or tingling in the right lower extremity.    REVIEW OF SYSTEMS  Constitutional: See HPI for pain assessment, No significant weight loss, recent trauma  Cardiovascular: No chest pain, shortness of breath  Respiratory: No difficulty breathing, cough  Gastrointestinal: No nausea, vomiting, diarrhea, constipation  Musculoskeletal: Noted in HPI, positive for pain, restricted motion, stiffness  Integumentary: No rashes, easy bruising, redness   Neurological: no numbness or tingling in extremities, no gait disturbances   Psychiatric: No mood changes, memory changes, social issues  Heme/Lymph: no excessive swelling, easy bruising, excessive bleeding  ENT: No hearing changes  Eyes: No vision changes    History reviewed. No pertinent past medical history.     Allergies   Allergen Reactions    Other Cough    Penicillins Rash        Past Surgical History:   Procedure Laterality Date    OTHER SURGICAL HISTORY  2022     section    OTHER SURGICAL HISTORY  2022    Knee arthroscopy    OTHER SURGICAL HISTORY  2022    Hand surgery  "       Family History   Problem Relation Name Age of Onset    Hypothyroidism Mother      Hypertension Father          Social History     Socioeconomic History    Marital status:      Spouse name: Not on file    Number of children: Not on file    Years of education: Not on file    Highest education level: Not on file   Occupational History    Not on file   Tobacco Use    Smoking status: Never     Passive exposure: Never    Smokeless tobacco: Never   Vaping Use    Vaping status: Never Used   Substance and Sexual Activity    Alcohol use: Not on file    Drug use: Not on file    Sexual activity: Not on file   Other Topics Concern    Not on file   Social History Narrative    Not on file     Social Determinants of Health     Financial Resource Strain: Not on file   Food Insecurity: Not on file   Transportation Needs: Not on file   Physical Activity: Not on file   Stress: Not on file   Social Connections: Not on file   Intimate Partner Violence: Not on file   Housing Stability: Not on file        CURRENT MEDICATIONS:   Current Outpatient Medications   Medication Sig Dispense Refill    albuterol 90 mcg/actuation inhaler Inhale 2 puffs every 6 hours if needed for wheezing. 18 g 11    calcium carbonate-vitamin D3 500 mg-5 mcg (200 unit) tablet Take 1 tablet by mouth once daily.      hydrOXYzine HCL (Atarax) 10 mg tablet Take 1 tablet (10 mg) by mouth every 6 hours if needed for itching or anxiety. 30 tablet 1     No current facility-administered medications for this visit.        OBJECTIVE  PHYSICAL EXAM      4/12/2024     9:19 AM 4/12/2024     9:56 AM 4/12/2024    10:01 AM 4/12/2024    10:06 AM 4/12/2024    10:16 AM 4/22/2024     1:39 PM 8/26/2024     1:55 PM   Vitals   Systolic 120 83 103 101 103 124    Diastolic 83 44 67 67 71 72    Heart Rate 76 61 68 61 62     Temp 36.5 °C (97.7 °F) 36.7 °C (98.1 °F)        Resp 11 18 18 18 12     Height (in)      1.626 m (5' 4\") 1.651 m (5' 5\")   Weight (lb)      169.6 170   BMI   "    29.11 kg/m2 28.29 kg/m2   BSA (m2)      1.86 m2 1.88 m2   Visit Report      Report Report      Body mass index is 28.29 kg/m².    GENERAL: A/Ox3, NAD. Appears healthy, well nourished  PSYCHIATRIC: Mood stable, appropriate memory recall  EYES: EOM intact, no scleral icterus  CARDIOVASCULAR: Palpable peripheral pulses  LUNGS: Breathing non-labored on room air    59 y.o. year-old in no acute distress. Well nourished. Normal affect. Alert and oriented x 3.   Gait: antalgic. Skin: Intact over the bilateral upper and lower extremities. No erythema, ecchymosis, or temperature changes.     Right Knee:  ROM: 0-120 degrees. Mild patellofemoral crepitus.  Mild effusion.  Good quadriceps contraction. Intact extensor mechanism. Patellar tendon non-tender.  Patella facets-tender.   Lachman stable. Anterior drawer stable. Posterior drawer negative.  Stable medial collateral ligament. Stable lateral collateral ligament.   POSITIVE medial joint line tenderness.  POSITIVE Caroline's.  Negative lateral joint line tenderness.  Negative Caroline's.       Left Knee:  ROM: 0-130 degrees. Negative crepitus.  No effusion.  Good quadriceps contraction. Intact extensor mechanism. Patellar tendon non-tender.  Patella facets non-tender.   Lachman stable. Anterior drawer stable. Pivot negative. Posterior drawer negative.  Stable medial collateral ligament. Stable lateral collateral ligament.   Negative medial joint line tenderness.  Negative Caroline's.  Negative lateral joint line tenderness.  Negative Caroline's.     Motor Strength: 5 out of 5 in the bilateral lower extremities.  Neuro: L4-S1 sensation intact grossly bilaterally. No clonus. 2+ and symmetric Patella and Achilles bilateral reflexes.  Vascular: 2+ DP/PT pulses bilaterally. Bilateral lower extremity compartments supple.    Imaging: Multiple views of the affected right knee(s) demonstrate: moderate to severe degenerative changes in the patellofemoral compartment. Mild  degenerative changes in the medial compartment with preserved joint space.   X-rays were personally reviewed and interpreted by me.  Radiology reports were reviewed by me as well, if readily available at the time.    ASSESSMENT & PLAN    Impression: 59 y.o. female with acute right knee pain after a fall.     Plan:   I explained to the patient the nature of their diagnosis.  I reviewed their imaging studies with them.    Based on the history, physical exam and imaging studies above, the patient's presentation is consistent with consistent with the above diagnosis.  I had a long discussion with the patient regarding their presentation and the treatment options.  We discussed initial nonoperative versus  further diagnostic imaging. Given the acute change in her knee pain after the fall, as well as her persistent effusion, we will obtain an MRI of the right knee     We will call her with the MRI results and discuss next steps.     At the end of the visit, all questions were answered in full. The patient is in agreement with the plan and recommendations. They will call the office with any questions/concerns.      Note dictated with [employer] software. Completed without full typed error editing and sent to avoid delay.       Srinivas Walters MD  Sports Medicine Fellow  Orthopaedic Surgery

## 2024-08-30 ENCOUNTER — HOSPITAL ENCOUNTER (OUTPATIENT)
Dept: RADIOLOGY | Facility: CLINIC | Age: 59
Discharge: HOME | End: 2024-08-30
Payer: COMMERCIAL

## 2024-08-30 DIAGNOSIS — S83.241A ACUTE MEDIAL MENISCUS TEAR, RIGHT, INITIAL ENCOUNTER: ICD-10-CM

## 2024-08-30 PROCEDURE — 73721 MRI JNT OF LWR EXTRE W/O DYE: CPT | Mod: RT

## 2024-09-06 ENCOUNTER — OFFICE VISIT (OUTPATIENT)
Dept: ORTHOPEDIC SURGERY | Facility: HOSPITAL | Age: 59
End: 2024-09-06
Payer: COMMERCIAL

## 2024-09-06 DIAGNOSIS — D48.19 TENOSYNOVIAL GIANT CELL TUMOR: Primary | ICD-10-CM

## 2024-09-06 PROCEDURE — 1036F TOBACCO NON-USER: CPT | Performed by: STUDENT IN AN ORGANIZED HEALTH CARE EDUCATION/TRAINING PROGRAM

## 2024-09-06 PROCEDURE — 99214 OFFICE O/P EST MOD 30 MIN: CPT | Performed by: STUDENT IN AN ORGANIZED HEALTH CARE EDUCATION/TRAINING PROGRAM

## 2024-09-06 ASSESSMENT — PAIN DESCRIPTION - DESCRIPTORS: DESCRIPTORS: ACHING;SORE

## 2024-09-06 ASSESSMENT — PAIN - FUNCTIONAL ASSESSMENT: PAIN_FUNCTIONAL_ASSESSMENT: 0-10

## 2024-09-06 ASSESSMENT — PAIN SCALES - GENERAL: PAINLEVEL_OUTOF10: 6

## 2024-09-06 NOTE — PROGRESS NOTES
Orthopaedic Surgery  New Patient Clinic Note    Tere Woods [id number] 2024    Reason for Consult: Right knee lesion    HPI: This is a very pleasant 59-year-old female who is here for evaluation of right knee pain as well as right knee lesion discovered on MRI.  She is very active at baseline and typically bikes and hikes for long distances.  She also still actively works and states that it is the pain that been occurring in her right knee has made it more difficult for her to stand for long peers of time or walk long distances.  She states that she knew she had osteoarthritis prior but she feels that she had a fall a couple months ago that seem to exacerbate the pain.  Ever since that time her ability to perform activities has decreased and she complains of pain in the anterior as well as posterior aspects of the knee.  Her primary complaint is she also feels mechanical symptoms of locking which make it difficult for her to perform range of motion exercises.  She previously saw the office of Dr. Quach who performed radiographs followed by an MRI.  This showed a lesion in the posterior aspect of the knee and she was therefore referred to our office for further recommendations.  She has not yet tried formal physical therapy but does take anti-inflammatories although she does not like to take exorbitant amounts and states that this modestly helps.  She is never had an injection in the knee but has had injections in the wrist as well as shoulder and states that she has had horrible reactions in the past with exacerbation of pain and therefore does not like to consider injections.    ROS:  15 point review of systems collected per intake sheet and negative except for as noted in HPI.    PMH: History reviewed. No pertinent past medical history. No history of DVT.     PSH:    Past Surgical History:   Procedure Laterality Date    OTHER SURGICAL HISTORY  2022     section    OTHER SURGICAL  HISTORY  12/21/2022    Knee arthroscopy    OTHER SURGICAL HISTORY  12/21/2022    Hand surgery        SHx: No smoking. No IVDU    Meds:   Current Outpatient Medications on File Prior to Visit   Medication Sig Dispense Refill    albuterol 90 mcg/actuation inhaler Inhale 2 puffs every 6 hours if needed for wheezing. 18 g 11    calcium carbonate-vitamin D3 500 mg-5 mcg (200 unit) tablet Take 1 tablet by mouth once daily.      hydrOXYzine HCL (Atarax) 10 mg tablet Take 1 tablet (10 mg) by mouth every 6 hours if needed for itching or anxiety. 30 tablet 1     No current facility-administered medications on file prior to visit.       PHYSICAL EXAM    GEN: AaOx4, NAD  HEENT: normocephalic atraumatic, EOMI, MMM, pupils equal and round  PSYCH: appropriate mood and affect  RESP: nonlabored breathing   CARDIAC: Extremities WWP, RRR to peripheral palpation  NEURO: CN 2-12 grossly intact  SKIN: Atraumatic    Physical exam of the right lower extremity shows no obvious knee joint effusion.  Skin is intact.  She is mild medial joint line pain but no lateral joint line pain.  She has slight tenderness in the posterior aspect of the knee but no palpable mass.  Knee is stable to varus valgus stress and has a negative Lachman.  EHL, dorsiflexion and plantarflexion are intact.  She has palpable pulses and brisk capillary refill distally.  Sensation is intact light touch in all distributions.    Imaging:    XR of the right knee, previously obtained and personally reviewed today, shows tricompartmental osteoarthritic changes with preserved joint space however peripheral osteophyte formation seen in all 3 compartments.    MRI without contrast was also performed and individually reviewed by myself.  This again demonstrates tricompartmental osteoarthritic changes as well as medial and lateral degenerative meniscal changes without obvious tear.  Cruciate ligaments are intact.  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  We went over conservative as well as operative managements.  I told her she already has baseline osteoarthritic changes as well as degenerative meniscal issues.  She does have pain but seems that her primary complaint is actually mechanical symptoms that she is experiencing.  From an osteoarthritis as well as meniscal issue I told her she could consider anti-inflammatories, injections as well as physical therapy.  Because she does have mechanical symptoms I told her I could also see with Dr. Quach thought about an arthroscopic approach as this would be less morbid than an open procedure.  Ultimately because she is already seeing arthritic changes she may be heading towards a total knee eventually 1 day.  Right now I told her this is all about symptom control and their money told that we could utilize however none of them are guaranteed to work.  From a tumor side of things I do believe this looks consistent with tenosynovial giant cell tumor and we went over conservative as well as surgical management of this disease as well.  We discussed observation, injections, arthroscopic synovectomy as well as open synovectomy in terms of procedural options.  I told her that arthroscopic synovectomy which could be performed at the time of a knee scope for her arthritic/meniscal issues may give her some relief but would not be guaranteed.  I find that it would be very unlikely that they would be able to access the entirety of the tumor from an arthroscopic approach but ultimately it gargles would be symptom relief at  this time and it could provide that.  I told her it is unlikely that given the tumor location that I think that it would cause many mechanical symptoms as the primary location of the tumor is really behind the femur and it only encroaches upon the cruciate insertions.  The most invasive and aggressive approach would be to perform a posterior synovectomy which in a young patient I certainly would be more eager to offer but in an older patient with current signs of degenerative changes within the knee and not sure that the long-term benefit would outweigh the risks.  We did discuss this and ultimately right now she is not sure that she wants to consider injections because of her history in the past.  She does want something done and for that reason I have reached out to the office of Dr. Quach once again to see whether or not they could have her come back to discuss potentially an arthroscopic approach upfront and reserving larger surgeries for if she were to fail that.  I can discuss this with Dr. Quach directly as well so we can come up with an appropriate plan of care moving forward.  I do not believe that systemic medication at this time would be a good option for her given the baseline changes we see in her knee already.

## 2024-09-10 ENCOUNTER — TELEPHONE (OUTPATIENT)
Dept: ORTHOPEDIC SURGERY | Facility: HOSPITAL | Age: 59
End: 2024-09-10
Payer: COMMERCIAL

## 2024-09-10 DIAGNOSIS — D48.19 TENOSYNOVIAL GIANT CELL TUMOR: Primary | ICD-10-CM

## 2024-09-27 ENCOUNTER — APPOINTMENT (OUTPATIENT)
Dept: ORTHOPEDIC SURGERY | Facility: HOSPITAL | Age: 59
End: 2024-09-27
Payer: COMMERCIAL

## 2024-10-11 ENCOUNTER — EVALUATION (OUTPATIENT)
Dept: PHYSICAL THERAPY | Facility: CLINIC | Age: 59
End: 2024-10-11
Payer: COMMERCIAL

## 2024-10-11 DIAGNOSIS — D48.19 TENOSYNOVIAL GIANT CELL TUMOR: ICD-10-CM

## 2024-10-11 DIAGNOSIS — M25.561 RIGHT KNEE PAIN, UNSPECIFIED CHRONICITY: Primary | ICD-10-CM

## 2024-10-11 PROCEDURE — 97161 PT EVAL LOW COMPLEX 20 MIN: CPT | Mod: GP | Performed by: PHYSICAL THERAPIST

## 2024-10-11 PROCEDURE — 97110 THERAPEUTIC EXERCISES: CPT | Mod: GP | Performed by: PHYSICAL THERAPIST

## 2024-10-11 ASSESSMENT — ENCOUNTER SYMPTOMS
OCCASIONAL FEELINGS OF UNSTEADINESS: 0
DEPRESSION: 0
LOSS OF SENSATION IN FEET: 0

## 2024-10-11 ASSESSMENT — PATIENT HEALTH QUESTIONNAIRE - PHQ9
1. LITTLE INTEREST OR PLEASURE IN DOING THINGS: NOT AT ALL
SUM OF ALL RESPONSES TO PHQ9 QUESTIONS 1 AND 2: 0
2. FEELING DOWN, DEPRESSED OR HOPELESS: NOT AT ALL

## 2024-10-11 ASSESSMENT — PAIN - FUNCTIONAL ASSESSMENT: PAIN_FUNCTIONAL_ASSESSMENT: 0-10

## 2024-10-11 ASSESSMENT — PAIN SCALES - GENERAL: PAINLEVEL_OUTOF10: 3

## 2024-10-11 NOTE — PROGRESS NOTES
Physical Therapy Evaluation and Treatment      Patient Name: Tere Woods  MRN: [id number]  Today's Date: 10/11/2024  Visit #1  Time Calculation  Start Time: 1015  Stop Time: 1045  Time Calculation (min): 30 min    Insurance:  Auth pending    Assessment:  Patient is presenting today with pain in right knee. Examination findings are consistent with motor control impairments in the LE. Patient will benefit from physical therapy services to improve listed impairments. Initiated treatment today to address these impairments.    Patient with the following impairments: decreased muscle performance, decreased ROM, decreased activity tolerance, pain, and participation restrictions    Patient's response to session: No change in pain, Increased ROM/joint mobility, and Increased knowledge and understanding    Plan:  Treatment/Interventions: Biofeedback, Cryotherapy, Education/ Instruction, Dry needling, Electrical stimulation, Gait training, Hot pack, Manual therapy, Neuromuscular re-education, Self care/ home management, Taping techniques, Therapeutic activities, Therapeutic exercises, Vasopneumatic device  PT Plan: Skilled PT  PT Frequency: 1 time per week  Duration: 12 weeks  Onset Date: 09/06/24  Rehab Potential: Good  Plan of Care Agreement: Patient    Current Problem:   1. Right knee pain, unspecified chronicity  Follow Up In Physical Therapy      2. Tenosynovial giant cell tumor  Referral to Physical Therapy          Subjective   Patient presenting today R knee pain. This started without mechanism of injury. Pain is worse with standing long periods of time and walking downhill. Pain is better with elevation, rest, and movement. Patient denies numbness/tingling and changes in bowel/bladder. Patient wishes to decrease pain, improve function, and be able to go through her day to day without worrying about her knee.    Pain Assessment: 0-10  0-10 (Numeric) Pain Score: 3    General  Referred By:   Swati QUEZADA Fall Risk Score (The score of 4 or more indicates an increased risk of falling): 0  Precautions Comment: Tenosynovial giant cell tumor    Objective   Hip ROM (L/R)  Flexion: 125°/125°  Abduction: 45°/45°  Extension: 10°/10°  External Rotation: 45°/45°  Internal rotation: 45°/45°    Knee ROM (L/R)  Extension: 0°/-2°  Flexion: 130°/123°    Specific Lower Extremity MMT (L/R)  Iliopsoas: 4/5, 4/5  Gluteals (prone): 4/5, 3+/5  Hip External Rotation: 4/5, 4/5  Hamstrings: 4/5, 4/5    DTR (L/R)  Patellar L4: 2+/2+  Achilles S1: 2+/2+    Special Tests  Ely's Test: Positive/Positive, R > L  Lachman's: Negative/Negative  Anterior Drawer: Negative/Negative  Posterior Drawer: Negative/Negative  Varus at 0°: Negative/Negative  Varus at 30°: Negative/Negative  Valgus at 0°: Negative/Negative  Valgus at 30: Negative/Negative  Caroline: Negative/Negative  Thessaly: Negative/Negative    Joint mobility testing (normal unless otherwise noted below)  Proximal Tibia-Fibular Joint:  PFJ: pain with superior and inferior glide    Dermatomes intact BLE    Gait: antalgic gait     Outcome Measures:  Other Measures  Lower Extremity Funtional Score (LEFS): 50     Treatments:  Therapeutic Exercise (15218): 12 minutes  HEP review  Quad set with SLR*  Bridges*  Side step; yellow TB*    Manual Therapy (68790): 2 minutes  PA tibiofemoral   Superior glide of patella    Education and discussion on HEP and treatment regarding the benefits related to current condition, POC, pathophysiology, and precautions    *added to HEP    Goals:  Patient will improve Lower Extremity Functional Scale score to meet minimal detectable change of improvement to improve performance of ADLs.    Patient will be independent with home exercise program for proper self-management of condition.    Patient will improve active range of motion in deficit areas for ADL completion.    Patient will improve strength in deficit areas so patient can work  with less pain.    Patient will be able to walk downhill without pain.

## 2024-10-21 ENCOUNTER — TREATMENT (OUTPATIENT)
Dept: PHYSICAL THERAPY | Facility: CLINIC | Age: 59
End: 2024-10-21
Payer: COMMERCIAL

## 2024-10-21 DIAGNOSIS — M25.561 RIGHT KNEE PAIN, UNSPECIFIED CHRONICITY: Primary | ICD-10-CM

## 2024-10-21 PROCEDURE — 97110 THERAPEUTIC EXERCISES: CPT | Mod: GP | Performed by: PHYSICAL THERAPIST

## 2024-10-21 ASSESSMENT — PAIN - FUNCTIONAL ASSESSMENT
PAIN_FUNCTIONAL_ASSESSMENT: 0-10
PAIN_FUNCTIONAL_ASSESSMENT: 0-10

## 2024-10-21 ASSESSMENT — PAIN SCALES - GENERAL
PAINLEVEL_OUTOF10: 1
PAINLEVEL_OUTOF10: 1

## 2024-10-21 NOTE — PROGRESS NOTES
Physical Therapy Treatment      Patient Name: Tere Woods  MRN: [id number]  Today's Date: 10/21/2024  Visit #2  Time Calculation  Start Time: 0715  Stop Time: 0748  Time Calculation (min): 33 min    Insurance:  Visit Limit: 12  Date Range: 12/31/24  Co-Pay: none    Assessment:  Patient was able to obtain full knee extension during today's session. Today's session focused on strength, ROM, neuromuscular control, endurance, joint mobility, soft tissue mobilization, and flexibility. Patient demonstrated good tolerance to session this date. They are demonstrating good progress in skilled rehabilitation at this time, though they are still limited by decreased muscle performance, decreased ROM, decreased activity tolerance, pain, participation restrictions, and difficulty with ADL completion. Patient continues to be a good candidate for skilled PT in order to further address listed impairments. Updated HEP to reflect today's session. All questions answered.     Patient's response to session: No change in pain, Increased ROM/joint mobility, Increase motor control, and Increased knowledge and understanding    Plan:  Continue per POC.    Current Problem:   1. Right knee pain, unspecified chronicity            Subjective   Patient reports she is doing okay. Her days vary on ability and intensity of deficits. She has not had much pain but does notice increased stiffness. She has been able to go through an entire work day without it limiting her too much.    Pain Assessment: 0-10  0-10 (Numeric) Pain Score: 1    Precautions  Precautions Comment: Tenosynovial giant cell tumor    Objective   Hip ROM (L/R)  Flexion: 125°/125°  Abduction: 45°/45°  Extension: 10°/10°  External Rotation: 45°/45°  Internal rotation: 45°/45°     Knee ROM (L/R)  Extension: 0°/0°  Flexion: 130°/125°      Special Tests  Ely's Test: Positive/Positive, R > L     Joint mobility testing (normal unless otherwise noted below)  Proximal Tibia-Fibular  Joint:  PFJ:      Gait: antalgic gait      Treatments:  Therapeutic Exercise (92974): 27 minutes  HEP review  Bridge with march  SLR with quad set  Side step, red TB  Monster walks, red TB*  PB wall squats*  TKE, blue TB*    Manual Therapy (76567): 3 minutes  PA tibiofemoral   Superior glide of patella    Neuromuscular Re-education (97789):  0 minutes  Not performed    Education and discussion on HEP and treatment regarding the benefits related to current condition, POC, pathophysiology, and precautions    *added to HEP     Care provided under direct supervision of Esdras De Paz, PT, DPT, OCS, CSCS

## 2024-10-28 ENCOUNTER — APPOINTMENT (OUTPATIENT)
Dept: PHYSICAL THERAPY | Facility: CLINIC | Age: 59
End: 2024-10-28
Payer: COMMERCIAL

## 2024-10-28 DIAGNOSIS — M25.561 RIGHT KNEE PAIN, UNSPECIFIED CHRONICITY: Primary | ICD-10-CM

## 2024-11-04 ENCOUNTER — TREATMENT (OUTPATIENT)
Dept: PHYSICAL THERAPY | Facility: CLINIC | Age: 59
End: 2024-11-04
Payer: COMMERCIAL

## 2024-11-04 DIAGNOSIS — M25.561 RIGHT KNEE PAIN, UNSPECIFIED CHRONICITY: Primary | ICD-10-CM

## 2024-11-04 PROCEDURE — 97110 THERAPEUTIC EXERCISES: CPT | Mod: GP | Performed by: PHYSICAL THERAPIST

## 2024-11-04 ASSESSMENT — PAIN - FUNCTIONAL ASSESSMENT: PAIN_FUNCTIONAL_ASSESSMENT: 0-10

## 2024-11-04 ASSESSMENT — PAIN SCALES - GENERAL: PAINLEVEL_OUTOF10: 1

## 2024-11-04 NOTE — PROGRESS NOTES
Physical Therapy Treatment      Patient Name: Tere Woods  MRN: [id number]  Today's Date: 11/4/2024  Visit #3  Time Calculation  Start Time: 0716  Stop Time: 0808  Time Calculation (min): 52 min    Insurance:  Visit Limit: 12  Date Range: 12/31/24  Co-Pay: none    Assessment:  Patient was able to improve motor control and decrease pain descending stairs. Today's session focused on strength, ROM, neuromuscular control, endurance, joint mobility, soft tissue mobilization, and flexibility. Patient demonstrated good tolerance to session this date. They are demonstrating good progress in skilled rehabilitation at this time, though they are still limited by decreased muscle performance, decreased ROM, decreased activity tolerance, pain, participation restrictions, and difficulty with ADL completion. Patient continues to be a good candidate for skilled PT in order to further address listed impairments. Updated HEP to reflect today's session. All questions answered.     Patient's response to session: No change in pain, Increased ROM/joint mobility, Increase motor control, and Increased knowledge and understanding    Plan:  Continue per POC.    Current Problem:   1. Right knee pain, unspecified chronicity  Follow Up In Physical Therapy          Subjective   Patient reports having noticed increased swelling and pain in anterior knee towards the end of the day. She does also report less instances of locking.     Pain Assessment: 0-10  0-10 (Numeric) Pain Score: 1    Precautions  Precautions Comment: Tenosynovial giant cell tumor    Objective   Hip ROM (L/R)  Flexion: 125°/125°  Abduction: 45°/45°  Extension: 10°/10°  External Rotation: 45°/45°  Internal rotation: 45°/45°     Knee ROM (L/R)  Extension: 0°/0°  Flexion: 130°/125°      Special Tests  Ely's Test: Positive/Positive, R > L     Joint mobility testing (normal unless otherwise noted below)  Proximal Tibia-Fibular Joint:  PFJ:      Gait: antalgic gait       Treatments:  Therapeutic Exercise (29634): 45 minutes  HEP review  Upright bike x5 min  Bridge with march  SLR with quad set  Quad stretch  Step down  RDL  PB wall squats  Hip series  Lateral stepping  Monster walks  Banded walks    Manual Therapy (07473): 5 minutes  AP tibiofemoral   Superior glide of patella    Neuromuscular Re-education (97070):  0 minutes  Not performed    Education and discussion on HEP and treatment regarding the benefits related to current condition, POC, pathophysiology, and precautions    *added to HEP     Care provided under direct supervision of Esdras De Paz, PT, DPT, OCS, CSCS

## 2024-11-08 ENCOUNTER — TELEPHONE (OUTPATIENT)
Dept: ORTHOPEDIC SURGERY | Facility: HOSPITAL | Age: 59
End: 2024-11-08
Payer: COMMERCIAL

## 2024-11-08 NOTE — TELEPHONE ENCOUNTER
Dr. Longoria opted to not perform surgery. Got second opinion from out of network provider who will. Said she spoke to Dr. Longoria this am who approved us sending a letter to insurance stating that fact. Needs to go to [email or web address].

## 2024-11-22 ENCOUNTER — TREATMENT (OUTPATIENT)
Dept: PHYSICAL THERAPY | Facility: CLINIC | Age: 59
End: 2024-11-22
Payer: COMMERCIAL

## 2024-11-22 DIAGNOSIS — M25.561 RIGHT KNEE PAIN, UNSPECIFIED CHRONICITY: Primary | ICD-10-CM

## 2024-11-22 PROCEDURE — 97110 THERAPEUTIC EXERCISES: CPT | Mod: GP | Performed by: PHYSICAL THERAPIST

## 2024-11-22 ASSESSMENT — PAIN - FUNCTIONAL ASSESSMENT: PAIN_FUNCTIONAL_ASSESSMENT: 0-10

## 2024-11-22 ASSESSMENT — PAIN SCALES - GENERAL: PAINLEVEL_OUTOF10: 1

## 2024-11-22 NOTE — PROGRESS NOTES
Physical Therapy Treatment      Patient Name: Tere Woods  MRN: [id number]  Today's Date: 11/22/2024  Visit #4  Time Calculation  Start Time: 0930  Stop Time: 1000  Time Calculation (min): 30 min    Insurance:  Visit Limit: 12  Date Range: 12/31/24  Co-Pay: none    Assessment:  Patient continuing to improve motor control and increase activity tolerance with good form. Today's session focused on strength, ROM, neuromuscular control, endurance, joint mobility, soft tissue mobilization, and flexibility. Patient demonstrated good tolerance to session this date. They are demonstrating good progress in skilled rehabilitation at this time, though they are still limited by decreased muscle performance, decreased ROM, decreased activity tolerance, pain, participation restrictions, and difficulty with ADL completion. Patient continues to be a good candidate for skilled PT in order to further address listed impairments. Updated HEP to reflect today's session. All questions answered.     Patient's response to session: No change in pain, Increased ROM/joint mobility, Increase motor control, and Increased knowledge and understanding    Plan:  Continue per POC.    Current Problem:   1. Right knee pain, unspecified chronicity  Follow Up In Physical Therapy          Subjective   Patient reports she has seen improvements in her functional mobility. Patient scheduled a synovectomy for December 20th and would like exercises to continue doing until then and post surgery.    Pain Assessment: 0-10  0-10 (Numeric) Pain Score: 1    Precautions  Precautions Comment: Tenosynovial giant cell tumor    Objective   Hip ROM (L/R)  Flexion: 125°/125°  Abduction: 45°/45°  Extension: 10°/10°  External Rotation: 45°/45°  Internal rotation: 45°/45°     Knee ROM (L/R)  Extension: 0°/0°  Flexion: 130°/125°      Special Tests  Ely's Test: Positive/Positive, R > L     Joint mobility testing (normal unless otherwise noted below)  Proximal Tibia-Fibular  Prior Authorization Not Needed per Insurance    Medication: NUTROPIN AQ NUSPIN 10 10 MG/2ML SC SOPN  Insurance Company: [employer] ([facility]) - Phone [phone] Fax [phone]  Expected CoPay:      Pharmacy Filling the Rx: [facility] - [place], [place] - [address]  Pharmacy Notified: Yes  Patient Notified: Yes     Joint:  PFJ:      Gait: antalgic gait      Treatments:  Therapeutic Exercise (47584): 30 minutes  HEP review  Upright bike x5 min  Bridge with march  SLR with quad set  Step down  PB wall squats  Lateral stepping  Education on safe exercises to do post op    Manual Therapy (92603):   minutes  Not performed    Neuromuscular Re-education (89222):   minutes  Not performed    Education and discussion on HEP and treatment regarding the benefits related to current condition, POC, pathophysiology, and precautions    *added to HEP     Care provided under direct supervision of Esdras De Paz, PT, DPT, OCS, CSCS

## 2024-12-13 ENCOUNTER — APPOINTMENT (OUTPATIENT)
Dept: PRIMARY CARE | Facility: CLINIC | Age: 59
End: 2024-12-13
Payer: COMMERCIAL

## 2024-12-13 VITALS
OXYGEN SATURATION: 100 % | WEIGHT: 169.8 LBS | DIASTOLIC BLOOD PRESSURE: 60 MMHG | SYSTOLIC BLOOD PRESSURE: 122 MMHG | BODY MASS INDEX: 28.29 KG/M2 | TEMPERATURE: 98 F | RESPIRATION RATE: 16 BRPM | HEART RATE: 61 BPM | HEIGHT: 65 IN

## 2024-12-13 DIAGNOSIS — E55.9 VITAMIN D DEFICIENCY: ICD-10-CM

## 2024-12-13 DIAGNOSIS — Z01.419 WELL FEMALE EXAM WITH ROUTINE GYNECOLOGICAL EXAM: ICD-10-CM

## 2024-12-13 DIAGNOSIS — F43.9 STRESS: ICD-10-CM

## 2024-12-13 DIAGNOSIS — J45.20 MILD INTERMITTENT ASTHMA WITHOUT COMPLICATION (HHS-HCC): ICD-10-CM

## 2024-12-13 DIAGNOSIS — Z12.31 BREAST CANCER SCREENING BY MAMMOGRAM: ICD-10-CM

## 2024-12-13 DIAGNOSIS — M25.561 RIGHT KNEE PAIN, UNSPECIFIED CHRONICITY: ICD-10-CM

## 2024-12-13 DIAGNOSIS — D72.819 LEUKOPENIA, UNSPECIFIED TYPE: ICD-10-CM

## 2024-12-13 DIAGNOSIS — M54.50 LOW BACK PAIN, UNSPECIFIED BACK PAIN LATERALITY, UNSPECIFIED CHRONICITY, UNSPECIFIED WHETHER SCIATICA PRESENT: ICD-10-CM

## 2024-12-13 DIAGNOSIS — Z00.00 HEALTHCARE MAINTENANCE: Primary | ICD-10-CM

## 2024-12-13 PROCEDURE — 99396 PREV VISIT EST AGE 40-64: CPT | Performed by: INTERNAL MEDICINE

## 2024-12-13 PROCEDURE — 1036F TOBACCO NON-USER: CPT | Performed by: INTERNAL MEDICINE

## 2024-12-13 PROCEDURE — 3008F BODY MASS INDEX DOCD: CPT | Performed by: INTERNAL MEDICINE

## 2024-12-13 RX ORDER — HYDROXYZINE HYDROCHLORIDE 10 MG/1
10 TABLET, FILM COATED ORAL EVERY 6 HOURS PRN
Qty: 30 TABLET | Refills: 3 | Status: SHIPPED | OUTPATIENT
Start: 2024-12-13 | End: 2025-02-11

## 2024-12-13 RX ORDER — CYCLOBENZAPRINE HCL 5 MG
5 TABLET ORAL 3 TIMES DAILY PRN
Qty: 30 TABLET | Refills: 1 | Status: SHIPPED | OUTPATIENT
Start: 2024-12-13 | End: 2025-02-11

## 2024-12-13 RX ORDER — ALBUTEROL SULFATE 90 UG/1
2 INHALANT RESPIRATORY (INHALATION) EVERY 6 HOURS PRN
Qty: 18 G | Refills: 11 | Status: SHIPPED | OUTPATIENT
Start: 2024-12-13 | End: 2025-12-13

## 2024-12-13 ASSESSMENT — ENCOUNTER SYMPTOMS
ABDOMINAL PAIN: 0
SEIZURES: 0
NECK STIFFNESS: 0
UNEXPECTED WEIGHT CHANGE: 0
ARTHRALGIAS: 0
MUSCULOSKELETAL NEGATIVE: 1
EYE PAIN: 0
DIFFICULTY URINATING: 0
FLANK PAIN: 0
HEADACHES: 0
FREQUENCY: 0
COLOR CHANGE: 0
POLYDIPSIA: 0
BRUISES/BLEEDS EASILY: 0
CONSTIPATION: 0
POLYPHAGIA: 0
JOINT SWELLING: 0
SINUS PRESSURE: 0
DIARRHEA: 0
BLOOD IN STOOL: 0
CONFUSION: 0
MYALGIAS: 0
STRIDOR: 0
FACIAL ASYMMETRY: 0
AGITATION: 0
RESPIRATORY NEGATIVE: 1
VOMITING: 0
BACK PAIN: 0
HALLUCINATIONS: 0
ENDOCRINE NEGATIVE: 1
APPETITE CHANGE: 0
EYE REDNESS: 0
SINUS PAIN: 0
SLEEP DISTURBANCE: 0
TREMORS: 0
DYSURIA: 0
SHORTNESS OF BREATH: 0
ADENOPATHY: 0
LIGHT-HEADEDNESS: 0
SORE THROAT: 0
CONSTITUTIONAL NEGATIVE: 1
NEUROLOGICAL NEGATIVE: 1
WOUND: 0
DIZZINESS: 0
CHEST TIGHTNESS: 0
WHEEZING: 0
NUMBNESS: 0
VOICE CHANGE: 0
PALPITATIONS: 0
EYES NEGATIVE: 1
ALLERGIC/IMMUNOLOGIC NEGATIVE: 1
WEAKNESS: 0
COUGH: 0
TROUBLE SWALLOWING: 0
NAUSEA: 0
SPEECH DIFFICULTY: 0
EYE DISCHARGE: 0
HEMATOLOGIC/LYMPHATIC NEGATIVE: 1
NECK PAIN: 0
ACTIVITY CHANGE: 0
CARDIOVASCULAR NEGATIVE: 1
PSYCHIATRIC NEGATIVE: 1
GASTROINTESTINAL NEGATIVE: 1
NERVOUS/ANXIOUS: 0

## 2024-12-13 ASSESSMENT — PATIENT HEALTH QUESTIONNAIRE - PHQ9
2. FEELING DOWN, DEPRESSED OR HOPELESS: NOT AT ALL
SUM OF ALL RESPONSES TO PHQ9 QUESTIONS 1 AND 2: 0
1. LITTLE INTEREST OR PLEASURE IN DOING THINGS: NOT AT ALL

## 2024-12-13 NOTE — PROGRESS NOTES
Subjective   Patient ID: Tere Woods is a 59 y.o. female who presents for Annual Exam (Pt is here due to annual physical ).  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    She developed right knee pain few months ago, additionally was experiencing some knee instability, mild swelling and clicking.  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    Underwent pre-op testing recently.  I reviewed blood work, ECG and CXR results which were within acceptable range.     Last mammogram: 2/2024  PAP: per GYN  Colonoscopy: 2024, next in 2029    We reviewed and discussed details of recent blood work: CBC, CMP, TSH, Lipid panel, Vit D done in January 2024.  Results within acceptable range.  Mild leukopenia noted.    Requests refills on her medications which she has been taking as needed.    Review of Systems   Constitutional: Negative.  Negative for activity change, appetite change and unexpected weight change.   HENT: Negative.  Negative for congestion, ear discharge, ear pain, hearing loss, mouth sores, nosebleeds, sinus pressure, sinus pain, sore throat, trouble swallowing and voice change.    Eyes: Negative.  Negative for pain, discharge, redness and visual disturbance.   Respiratory: Negative.  Negative for cough, chest tightness, shortness of breath, wheezing and stridor.    Cardiovascular: Negative.  Negative for chest pain, palpitations and leg swelling.   Gastrointestinal: Negative.  Negative for abdominal pain, blood in stool, constipation, diarrhea, nausea and vomiting.   Endocrine: Negative.  Negative for polydipsia, polyphagia and polyuria.   Genitourinary: Negative.  Negative for difficulty urinating, dysuria, flank pain, frequency and urgency.   Musculoskeletal: Negative.  Negative for arthralgias, back pain, gait  "problem, joint swelling, myalgias, neck pain and neck stiffness.   Skin: Negative.  Negative for color change, rash and wound.   Allergic/Immunologic: Negative.  Negative for environmental allergies, food allergies and immunocompromised state.   Neurological: Negative.  Negative for dizziness, tremors, seizures, syncope, facial asymmetry, speech difficulty, weakness, light-headedness, numbness and headaches.   Hematological: Negative.  Negative for adenopathy. Does not bruise/bleed easily.   Psychiatric/Behavioral: Negative.  Negative for agitation, behavioral problems, confusion, hallucinations, sleep disturbance and suicidal ideas. The patient is not nervous/anxious.    All other systems reviewed and are negative.      Objective     Review of systems was performed and all systems were negative except what in HPI    /60 (BP Location: Right arm, Patient Position: Sitting, BP Cuff Size: Adult)   Pulse 61   Temp 36.7 °C (98 °F) (Temporal)   Resp 16   Ht 1.651 m (5' 5\")   Wt 77 kg (169 lb 12.8 oz)   LMP  (LMP Unknown)   SpO2 100%   BMI 28.26 kg/m²    Physical Exam  Vitals and nursing note reviewed.   Constitutional:       General: She is not in acute distress.     Appearance: Normal appearance.   HENT:      Head: Normocephalic and atraumatic.      Right Ear: Tympanic membrane, ear canal and external ear normal.      Left Ear: Tympanic membrane, ear canal and external ear normal.      Nose: Nose normal. No congestion or rhinorrhea.      Mouth/Throat:      Mouth: Mucous membranes are moist.      Pharynx: Oropharynx is clear.   Eyes:      General:         Right eye: No discharge.         Left eye: No discharge.      Extraocular Movements: Extraocular movements intact.      Conjunctiva/sclera: Conjunctivae normal.      Pupils: Pupils are equal, round, and reactive to light.   Cardiovascular:      Rate and Rhythm: Normal rate and regular rhythm.      Pulses: Normal pulses.      Heart sounds: Normal heart " sounds. No murmur heard.     No friction rub. No gallop.   Pulmonary:      Effort: Pulmonary effort is normal. No respiratory distress.      Breath sounds: Normal breath sounds. No stridor. No wheezing, rhonchi or rales.   Chest:      Chest wall: No tenderness.   Abdominal:      General: Bowel sounds are normal.      Palpations: Abdomen is soft. There is no mass.      Tenderness: There is no abdominal tenderness. There is no guarding or rebound.   Musculoskeletal:         General: No swelling or deformity. Normal range of motion.      Cervical back: Normal range of motion and neck supple. No rigidity or tenderness.      Right lower leg: No edema.      Left lower leg: No edema.   Lymphadenopathy:      Cervical: No cervical adenopathy.   Skin:     General: Skin is warm and dry.      Coloration: Skin is not jaundiced.      Findings: No bruising or erythema.   Neurological:      General: No focal deficit present.      Mental Status: She is alert and oriented to person, place, and time. Mental status is at baseline.      Cranial Nerves: No cranial nerve deficit.      Motor: No weakness.      Coordination: Coordination normal.      Gait: Gait normal.   Psychiatric:         Mood and Affect: Mood normal.         Behavior: Behavior normal.         Thought Content: Thought content normal.         Judgment: Judgment normal.         Assessment/Plan   Problem List Items Addressed This Visit             ICD-10-CM       Health Encounters    Healthcare maintenance - Primary Z00.00    Relevant Orders    Comprehensive Metabolic Panel    Lipid Panel    TSH with reflex to Free T4 if abnormal    Urinalysis with Reflex Microscopic       Mental Health    Stress F43.9    Relevant Medications    hydrOXYzine HCL (Atarax) 10 mg tablet       Musculoskeletal and Injuries    Back pain M54.9    Relevant Medications    cyclobenzaprine (Flexeril) 5 mg tablet    Right knee pain M25.561     F/up with ortho.            Pulmonary and Pneumonias     Asthma J45.909    Relevant Medications    albuterol 90 mcg/actuation inhaler     Other Visit Diagnoses         Codes    Well female exam with routine gynecological exam     Z01.419    Relevant Orders    Referral to Gynecology    Vitamin D deficiency     E55.9    Relevant Orders    Vitamin D 25-Hydroxy,Total (for eval of Vitamin D levels)    Leukopenia, unspecified type     D72.819    Relevant Orders    CBC and Auto Differential    Breast cancer screening by mammogram     Z12.31    Relevant Orders    BI mammo bilateral screening tomosynthesis        It was a pleasure to see you today.  I would like to remind you about importance of a healthy lifestyle in order to improve your well-being and live longer.  Try to engage in physical activities for at least 150 minutes per week.  Eat about 10 servings of fruits and vegetables daily. My advice is 2 servings of fruits and 8 servings of vegetables.  For vegetables choose at least half of them green and at least half of them fresh.  Please avoid sugar, salt, fried food and saturated fat.    F/up in 1 year or sooner if needed.

## 2024-12-16 ENCOUNTER — APPOINTMENT (OUTPATIENT)
Dept: PHYSICAL THERAPY | Facility: CLINIC | Age: 59
End: 2024-12-16
Payer: COMMERCIAL

## 2024-12-16 DIAGNOSIS — M25.561 RIGHT KNEE PAIN, UNSPECIFIED CHRONICITY: Primary | ICD-10-CM

## 2024-12-20 ENCOUNTER — APPOINTMENT (OUTPATIENT)
Dept: PRIMARY CARE | Facility: CLINIC | Age: 59
End: 2024-12-20
Payer: COMMERCIAL

## 2025-01-03 ENCOUNTER — APPOINTMENT (OUTPATIENT)
Dept: PHYSICAL THERAPY | Facility: CLINIC | Age: 60
End: 2025-01-03
Payer: COMMERCIAL

## 2025-01-03 DIAGNOSIS — M25.561 RIGHT KNEE PAIN, UNSPECIFIED CHRONICITY: Primary | ICD-10-CM

## 2025-01-06 DIAGNOSIS — M25.561 RIGHT KNEE PAIN, UNSPECIFIED CHRONICITY: Primary | ICD-10-CM

## 2025-01-06 RX ORDER — MELOXICAM 7.5 MG/1
7.5 TABLET ORAL DAILY
Qty: 90 TABLET | Refills: 0 | Status: SHIPPED | OUTPATIENT
Start: 2025-01-06 | End: 2025-04-06

## 2025-01-10 ENCOUNTER — APPOINTMENT (OUTPATIENT)
Dept: PHYSICAL THERAPY | Facility: CLINIC | Age: 60
End: 2025-01-10
Payer: COMMERCIAL

## 2025-01-10 DIAGNOSIS — M25.561 RIGHT KNEE PAIN, UNSPECIFIED CHRONICITY: Primary | ICD-10-CM

## 2025-02-14 ENCOUNTER — PATIENT MESSAGE (OUTPATIENT)
Dept: PRIMARY CARE | Facility: CLINIC | Age: 60
End: 2025-02-14
Payer: COMMERCIAL

## 2025-02-14 LAB
25(OH)D3+25(OH)D2 SERPL-MCNC: 45 NG/ML (ref 30–100)
ALBUMIN SERPL-MCNC: 4.2 G/DL (ref 3.6–5.1)
ALP SERPL-CCNC: 42 U/L (ref 37–153)
ALT SERPL-CCNC: 19 U/L (ref 6–29)
ANION GAP SERPL CALCULATED.4IONS-SCNC: 7 MMOL/L (CALC) (ref 7–17)
APPEARANCE UR: CLEAR
AST SERPL-CCNC: 17 U/L (ref 10–35)
BASOPHILS # BLD AUTO: 31 CELLS/UL (ref 0–200)
BASOPHILS NFR BLD AUTO: 0.8 %
BILIRUB SERPL-MCNC: 0.5 MG/DL (ref 0.2–1.2)
BILIRUB UR QL STRIP: NEGATIVE
BUN SERPL-MCNC: 19 MG/DL (ref 7–25)
CALCIUM SERPL-MCNC: 9.1 MG/DL (ref 8.6–10.4)
CHLORIDE SERPL-SCNC: 108 MMOL/L (ref 98–110)
CHOLEST SERPL-MCNC: 195 MG/DL
CHOLEST/HDLC SERPL: 2.7 (CALC)
CO2 SERPL-SCNC: 27 MMOL/L (ref 20–32)
COLOR UR: YELLOW
CREAT SERPL-MCNC: 0.67 MG/DL (ref 0.5–1.03)
EGFRCR SERPLBLD CKD-EPI 2021: 101 ML/MIN/1.73M2
EOSINOPHIL # BLD AUTO: 339 CELLS/UL (ref 15–500)
EOSINOPHIL NFR BLD AUTO: 8.7 %
ERYTHROCYTE [DISTWIDTH] IN BLOOD BY AUTOMATED COUNT: 12.4 % (ref 11–15)
GLUCOSE SERPL-MCNC: 76 MG/DL (ref 65–99)
GLUCOSE UR QL STRIP: NEGATIVE
HCT VFR BLD AUTO: 41 % (ref 35–45)
HDLC SERPL-MCNC: 73 MG/DL
HGB BLD-MCNC: 13.4 G/DL (ref 11.7–15.5)
HGB UR QL STRIP: NEGATIVE
KETONES UR QL STRIP: NEGATIVE
LDLC SERPL CALC-MCNC: 109 MG/DL (CALC)
LEUKOCYTE ESTERASE UR QL STRIP: NEGATIVE
LYMPHOCYTES # BLD AUTO: 1131 CELLS/UL (ref 850–3900)
LYMPHOCYTES NFR BLD AUTO: 29 %
MCH RBC QN AUTO: 30.8 PG (ref 27–33)
MCHC RBC AUTO-ENTMCNC: 32.7 G/DL (ref 32–36)
MCV RBC AUTO: 94.3 FL (ref 80–100)
MONOCYTES # BLD AUTO: 312 CELLS/UL (ref 200–950)
MONOCYTES NFR BLD AUTO: 8 %
NEUTROPHILS # BLD AUTO: 2087 CELLS/UL (ref 1500–7800)
NEUTROPHILS NFR BLD AUTO: 53.5 %
NITRITE UR QL STRIP: NEGATIVE
NONHDLC SERPL-MCNC: 122 MG/DL (CALC)
PH UR STRIP: 5.5 [PH] (ref 5–8)
PLATELET # BLD AUTO: 337 THOUSAND/UL (ref 140–400)
PMV BLD REES-ECKER: 9.7 FL (ref 7.5–12.5)
POTASSIUM SERPL-SCNC: 4.2 MMOL/L (ref 3.5–5.3)
PROT SERPL-MCNC: 6.9 G/DL (ref 6.1–8.1)
PROT UR QL STRIP: NEGATIVE
RBC # BLD AUTO: 4.35 MILLION/UL (ref 3.8–5.1)
SODIUM SERPL-SCNC: 142 MMOL/L (ref 135–146)
SP GR UR STRIP: 1.02 (ref 1–1.03)
TRIGL SERPL-MCNC: 43 MG/DL
TSH SERPL-ACNC: 2.38 MIU/L (ref 0.4–4.5)
WBC # BLD AUTO: 3.9 THOUSAND/UL (ref 3.8–10.8)

## 2025-02-16 NOTE — RESULT ENCOUNTER NOTE
Your recent lab work was acceptable. All results may not be within normal range but they are not clinically significant at this time and do not require change in your therapy. We will discuss details during your next office visit. Please keep your next appointment as scheduled. Dr. Garzon

## 2025-02-17 ENCOUNTER — HOSPITAL ENCOUNTER (OUTPATIENT)
Dept: RADIOLOGY | Facility: CLINIC | Age: 60
Discharge: HOME | End: 2025-02-17
Payer: COMMERCIAL

## 2025-02-17 VITALS — WEIGHT: 169.75 LBS | HEIGHT: 59 IN | BODY MASS INDEX: 34.22 KG/M2

## 2025-02-17 DIAGNOSIS — Z12.31 BREAST CANCER SCREENING BY MAMMOGRAM: ICD-10-CM

## 2025-02-17 PROCEDURE — 77067 SCR MAMMO BI INCL CAD: CPT | Performed by: RADIOLOGY

## 2025-02-17 PROCEDURE — 77067 SCR MAMMO BI INCL CAD: CPT

## 2025-02-17 PROCEDURE — 77063 BREAST TOMOSYNTHESIS BI: CPT | Performed by: RADIOLOGY

## 2025-02-17 NOTE — PATIENT COMMUNICATION
Called patient and advised her that results were released to her Mychart the evening of 2/14. She did confirm she was able to view. She was just concerned as she usually gets results next day however I did relay to her that there is a bit of a delay with results since moving over to [facility] and to allow another day or two to see results. I also confirmed with patient that Dr Garzon is listed as PCP on the recent labs on the [facility] website on our end.

## 2025-04-01 ENCOUNTER — EVALUATION (OUTPATIENT)
Dept: PHYSICAL THERAPY | Facility: CLINIC | Age: 60
End: 2025-04-01
Payer: COMMERCIAL

## 2025-04-01 DIAGNOSIS — M25.561 RIGHT KNEE PAIN, UNSPECIFIED CHRONICITY: ICD-10-CM

## 2025-04-01 DIAGNOSIS — Z98.890 S/P RIGHT KNEE SURGERY: Primary | ICD-10-CM

## 2025-04-01 PROCEDURE — 97110 THERAPEUTIC EXERCISES: CPT | Mod: GP | Performed by: PHYSICAL THERAPIST

## 2025-04-01 PROCEDURE — 97161 PT EVAL LOW COMPLEX 20 MIN: CPT | Mod: GP | Performed by: PHYSICAL THERAPIST

## 2025-04-01 ASSESSMENT — ENCOUNTER SYMPTOMS
DEPRESSION: 0
OCCASIONAL FEELINGS OF UNSTEADINESS: 0
LOSS OF SENSATION IN FEET: 0

## 2025-04-01 ASSESSMENT — PAIN - FUNCTIONAL ASSESSMENT: PAIN_FUNCTIONAL_ASSESSMENT: 0-10

## 2025-04-01 ASSESSMENT — PAIN SCALES - GENERAL: PAINLEVEL_OUTOF10: 2

## 2025-04-01 NOTE — PROGRESS NOTES
"Tere Woods is a 59 y.o. here for well woman visit  HPI: Sexually active, some dryness.  Sometimes a lubricant.    DEXA: 2023, repeat 2025  Colonoscopy: 2024, repeat 2029  GynHx: menopause, not periods after mirena removed  Pap Hx: no issues  Social History     Substance and Sexual Activity   Sexual Activity Yes    Partners: Male     STIs: none  HPV vaccine  Exercise: yes, bikes, walks, pt, work out.  Past med hx and past surg hx reviewed and notable for: just had knee surgery    Objective   /70   Ht 1.51 m (4' 11.45\")   Wt 76.7 kg (169 lb)   LMP  (LMP Unknown)   BMI 33.62 kg/m²   Physical Exam  Vitals reviewed.   Constitutional:       Appearance: Normal appearance. She is normal weight.   HENT:      Head: Normocephalic.   Neck:      Thyroid: No thyroid mass or thyromegaly.   Pulmonary:      Effort: Pulmonary effort is normal.   Chest:   Breasts:     Right: Normal.      Left: Normal.   Abdominal:      Palpations: Abdomen is soft.   Genitourinary:     General: Normal vulva.      Exam position: Lithotomy position.      Vagina: Normal.      Cervix: Normal.      Uterus: Normal.       Adnexa: Right adnexa normal and left adnexa normal.   Musculoskeletal:         General: Normal range of motion.      Cervical back: Normal range of motion and neck supple.   Lymphadenopathy:      Upper Body:      Right upper body: No supraclavicular or axillary adenopathy.      Left upper body: No supraclavicular or axillary adenopathy.   Skin:     General: Skin is warm and dry.   Neurological:      General: No focal deficit present.      Mental Status: She is alert.   Psychiatric:         Mood and Affect: Mood normal.         Behavior: Behavior normal.         Thought Content: Thought content normal.         Judgment: Judgment normal.        Assessment and Plan:  Problem List Items Addressed This Visit          Ob-Gyn Problems    Well female exam with routine gynecological exam    Overview     Thank you for your visit for " well woman care.  Your pap smear is due in 2029.  You just had a normal mammogram.  Your DEXA test is due this year.          Other Visit Diagnoses       Osteopenia, unspecified location        Relevant Orders    XR DEXA bone density           Orders Placed This Encounter   Procedures    XR DEXA bone density     Standing Status:   Future     Standing Expiration Date:   4/3/2026     Order Specific Question:   Reason for exam:     Answer:   osteopenia     Order Specific Question:   Is the patient pregnant?     Answer:   No     Order Specific Question:   Radiologist to Determine Optimal Study     Answer:   Yes     Order Specific Question:   Release result to ABS     Answer:   Immediate [1]     Order Specific Question:   Is this exam part of a Research Study? If Yes, link this order to the research study     Answer:   No

## 2025-04-01 NOTE — PROGRESS NOTES
Patient discharged from previous plan of care starting on 10/11/2024 with last date on 11/22/2024.   Awake/Alert Cooperative/Awake/Alert

## 2025-04-01 NOTE — PROGRESS NOTES
Physical Therapy Evaluation and Treatment      Patient Name: Tere Woods  MRN: [id number]  Today's Date: 4/1/2025  Visit #1       Insurance:  Visit Limit: ***  Date Range: ***  Co-Pay: ***    Assessment:  Patient is presenting today ***. Examination findings are consistent with ***. Patient will benefit from physical therapy services to improve listed impairments. Initiated treatment today to address these impairments.    Patient with the following impairments: {TESIMPAIRMENTS:68494}    Patient's response to session: {TESRESPONSE:81757}    Plan:       Current Problem:   1. Right knee pain, unspecified chronicity            Subjective   Patient presenting today ***. This started *** without mechanism of injury. Pain is worse with ***. Pain is better with ***. Patient denies {TESDENIES:55163}. Patient wishes to ***.                   Objective   ***  Outcome Measures:  {PT Outcome Measures:88648}     Treatments:  Therapeutic Exercise (10708):   minutes      Manual Therapy (55556):   minutes      Neuromuscular Re-education (82553):   minutes      Education and discussion on HEP and treatment regarding the benefits related to current condition, POC, pathophysiology, and precautions    *added to HEP    Goals:  Patient will improve {TESOM:66722} score to meet minimal detectable change of improvement to improve performance of ADLs.    Patient will be independent with home exercise program for proper self-management of condition.    Patient will improve active range of motion in deficit areas for ADL completion.    Patient will improve strength in deficit areas so patient can work with less pain.    Patient will *** without pain.

## 2025-04-01 NOTE — PROGRESS NOTES
Physical Therapy Evaluation and Treatment      Patient Name: Tere Woods  MRN: [id number]  Today's Date: 4/1/2025  Visit #1  Time Calculation  Start Time: 0905  Stop Time: 0935  Time Calculation (min): 30 min    Insurance:  Visit Limit: 20  Auth pending  Copay: $25    Assessment:  Patient is presenting today s/p R. Knee synovectomy on 3/28/2025. No signs or symptoms consistent with infection, rejection, or DVT. Examination findings are consistent with decreased ROM and muscular performance on the RLE. Patient will benefit from physical therapy services to improve listed impairments. Initiated treatment today to address these impairments.    Patient with the following impairments: decreased muscle performance, decreased ROM, decreased activity tolerance, pain, participation restrictions, and difficulty with ADL completion    Patient's response to session: No change in pain, Increased ROM/joint mobility, Increase motor control, Improved gait, and Increased knowledge and understanding    Plan:  Treatment/Interventions: Biofeedback, Dry needling, Education/ Instruction, Electrical stimulation, Cryotherapy, Gait training, Manual therapy, Neuromuscular re-education, Self care/ home management, Therapeutic activities, Therapeutic exercises  PT Plan: Skilled PT  PT Frequency: 1 time per week  Duration: 12 weeks  Onset Date: 04/01/25  Rehab Potential: Good  Plan of Care Agreement: Patient    Current Problem:   1. Right knee pain, unspecified chronicity        2. S/P right knee surgery            Subjective   Patient presenting today 5 days s/p R. knee synovectomy on 3/28/2025. Patient reports pain is well managed with OTC pain medications and ice. Reports difficulty with stairs, prolonged walking, or prolonged standing. Pain is better with rest. Patient reports new onset of numbness and tingling in the RLE along the lateral aspect of the dorsal foot. Patient wishes to return to daily ADLs, stair completion, and be able  to ride a bicycle again pain free.    Pain Assessment: 0-10  0-10 (Numeric) Pain Score: 2    General  Reason for Referral: s/p r. knee synovectomy  Referred By: self    Precautions  CHILOADI Fall Risk Score (The score of 4 or more indicates an increased risk of falling): 0  Precautions Comment: right knee synovectomy on 3/28/2025    Objective   Knee ROM (L/R)  Extension: 0°/-2°  Flexion: 131°/96°    Special Tests- deferred d/t recent surgery    Joint mobility testing (normal unless otherwise noted below)  Proximal Tibia-Fibular Joint: not tested  PFJ: painful on right in all directions, mildly limited    Dermatomes intact BLE    Gait: antalgic, decreased stance on RLE    Negative Wells DVT criteria    Outcome Measures:  Other Measures  Lower Extremity Funtional Score (LEFS): 28     Treatments:  Therapeutic Exercise (07267): 15 minutes  HEP review  Heel slides*  Quad sets*  Bridge*  SLR with quad set*  Education on edema management    Manual Therapy (49674):   0 minutes  Not Performed    Neuromuscular Re-education (80269):  0 minutes  Not performed    Education and discussion on HEP and treatment regarding the benefits related to current condition, POC, pathophysiology, and precautions    *added to HEP    Goals:  Patient will improve Lower Extremity Functional Scale score to meet minimal detectable change of improvement to improve performance of ADLs.    Patient will be independent with home exercise program for proper self-management of condition.    Patient will improve active range of motion in deficit areas for ADL completion.    Patient will improve strength in deficit areas so patient can work with less pain.    Patient will return to work without pain.     Care provided under direct supervision of Esdras De Paz, PT, DPT, OCS, CSCS

## 2025-04-03 ENCOUNTER — APPOINTMENT (OUTPATIENT)
Dept: OBSTETRICS AND GYNECOLOGY | Facility: CLINIC | Age: 60
End: 2025-04-03
Payer: COMMERCIAL

## 2025-04-03 VITALS
SYSTOLIC BLOOD PRESSURE: 118 MMHG | BODY MASS INDEX: 34.07 KG/M2 | WEIGHT: 169 LBS | HEIGHT: 59 IN | DIASTOLIC BLOOD PRESSURE: 70 MMHG

## 2025-04-03 DIAGNOSIS — M85.80 OSTEOPENIA, UNSPECIFIED LOCATION: ICD-10-CM

## 2025-04-03 DIAGNOSIS — Z01.419 WELL FEMALE EXAM WITH ROUTINE GYNECOLOGICAL EXAM: ICD-10-CM

## 2025-04-03 RX ORDER — IBUPROFEN 600 MG/1
TABLET ORAL
COMMUNITY
Start: 2025-03-28

## 2025-04-03 ASSESSMENT — PAIN SCALES - GENERAL: PAINLEVEL_OUTOF10: 0-NO PAIN

## 2025-04-04 ENCOUNTER — APPOINTMENT (OUTPATIENT)
Dept: PHYSICAL THERAPY | Facility: CLINIC | Age: 60
End: 2025-04-04
Payer: COMMERCIAL

## 2025-04-04 DIAGNOSIS — Z98.890 S/P RIGHT KNEE SURGERY: ICD-10-CM

## 2025-04-04 DIAGNOSIS — M25.561 RIGHT KNEE PAIN, UNSPECIFIED CHRONICITY: Primary | ICD-10-CM

## 2025-04-08 ENCOUNTER — TREATMENT (OUTPATIENT)
Dept: PHYSICAL THERAPY | Facility: CLINIC | Age: 60
End: 2025-04-08
Payer: COMMERCIAL

## 2025-04-08 DIAGNOSIS — Z98.890 S/P RIGHT KNEE SURGERY: ICD-10-CM

## 2025-04-08 DIAGNOSIS — M25.561 RIGHT KNEE PAIN, UNSPECIFIED CHRONICITY: Primary | ICD-10-CM

## 2025-04-08 PROCEDURE — 97110 THERAPEUTIC EXERCISES: CPT | Mod: GP | Performed by: PHYSICAL THERAPIST

## 2025-04-08 ASSESSMENT — PAIN SCALES - GENERAL: PAINLEVEL_OUTOF10: 1

## 2025-04-08 ASSESSMENT — PAIN - FUNCTIONAL ASSESSMENT: PAIN_FUNCTIONAL_ASSESSMENT: 0-10

## 2025-04-08 NOTE — PROGRESS NOTES
Physical Therapy Treatment      Patient Name: Tere Woods  MRN: [id number]  Today's Date: 4/8/2025  Visit #2  Time Calculation  Start Time: 0900  Stop Time: 0945  Time Calculation (min): 45 min    Insurance:  Visit Limit: ***  Date Range: ***  Co-Pay: ***    Assessment:  Today's session focused on strength, ROM, neuromuscular control, endurance, joint mobility, soft tissue mobilization, and flexibility. Patient demonstrated good tolerance to session this date. They are demonstrating good progress in skilled rehabilitation at this time, though they are still limited by decreased muscle performance, decreased ROM, decreased activity tolerance, pain, participation restrictions, impaired balance/gait, and difficulty with ADL completion. Patient continues to be a good candidate for skilled PT in order to further address listed impairments. Updated HEP to reflect today's session. All questions answered.    Patient's response to session: No change in pain, Increased ROM/joint mobility, Increase motor control, and Increased knowledge and understanding    Plan:  Continue per POC.    Current Problem:   1. Right knee pain, unspecified chronicity        2. S/P right knee surgery            Subjective   Patient reports she is doing well overall. She feels that she has had more swelling in RLE and some exudate from posterior incision that was clear. No other signs or symptoms of infection. She is 1.4 weeks s/p R knee synovectomy on 3/28/2025.    Pain Assessment: 0-10  0-10 (Numeric) Pain Score: 1    Precautions  Precautions Comment: right knee synovectomy on 3/28/2025    Objective   Knee ROM (L/R)  Extension: 0°/0°  Flexion: 131°/103° - limited by compression wrap on knee     Joint mobility testing (normal unless otherwise noted below)  Proximal Tibia-Fibular Joint:   PFJ: painful on right in all directions, mildly limited     Gait: antalgic, decreased stance on RLE     Negative Wells DVT criteria    Treatments:  Therapeutic  "Exercise (69281): 41 minutes  HEP review  Upright bike x 5 min  SLR  Bridges  Side steps, green TB*  Band walks, green TB  Lateral step down, 6\"*  Step up, 6\"  SL RDL  Leg press, 70#  Education on edema management    Manual Therapy (68251):  0 minutes  Not performed    Neuromuscular Re-education (86988):  0 minutes  Not performed    Education and discussion on HEP and treatment regarding the benefits related to current condition, POC, pathophysiology, and precautions    *added to HEP  "

## 2025-04-11 ENCOUNTER — APPOINTMENT (OUTPATIENT)
Dept: PHYSICAL THERAPY | Facility: CLINIC | Age: 60
End: 2025-04-11
Payer: COMMERCIAL

## 2025-04-11 DIAGNOSIS — M25.561 RIGHT KNEE PAIN, UNSPECIFIED CHRONICITY: Primary | ICD-10-CM

## 2025-04-11 DIAGNOSIS — Z98.890 S/P RIGHT KNEE SURGERY: ICD-10-CM

## 2025-04-14 ENCOUNTER — TREATMENT (OUTPATIENT)
Dept: PHYSICAL THERAPY | Facility: CLINIC | Age: 60
End: 2025-04-14
Payer: COMMERCIAL

## 2025-04-14 DIAGNOSIS — M25.561 RIGHT KNEE PAIN, UNSPECIFIED CHRONICITY: Primary | ICD-10-CM

## 2025-04-14 DIAGNOSIS — Z98.890 S/P RIGHT KNEE SURGERY: ICD-10-CM

## 2025-04-14 PROCEDURE — 97110 THERAPEUTIC EXERCISES: CPT | Mod: GP | Performed by: PHYSICAL THERAPIST

## 2025-04-14 ASSESSMENT — PAIN SCALES - GENERAL: PAINLEVEL_OUTOF10: 2

## 2025-04-14 ASSESSMENT — PAIN - FUNCTIONAL ASSESSMENT: PAIN_FUNCTIONAL_ASSESSMENT: 0-10

## 2025-04-14 NOTE — PROGRESS NOTES
Physical Therapy Treatment      Patient Name: Tere Woods  MRN: [id number]  Today's Date: 4/14/2025  Visit #3 (7 of 20)  Time Calculation  Start Time: 0800  Stop Time: 0825  Time Calculation (min): 25 min    Insurance:  Visit Limit: 20   Date Range: 7/30/2025  Co-Pay: None    Assessment:  Patient's incision on posterior right knee causing mild discomfort. Patient showed a picture of incision since it is currently covered with gauze and highly advised patient to follow up with PCP or urgent care for further evaluation. Today's session focused on strength, ROM, neuromuscular control, endurance, joint mobility, soft tissue mobilization, and flexibility. Patient demonstrated good tolerance to session this date. They are demonstrating good progress in skilled rehabilitation at this time, though they are still limited by decreased muscle performance, decreased ROM, decreased activity tolerance, pain, participation restrictions, impaired balance/gait, and difficulty with ADL completion. Patient continues to be a good candidate for skilled PT in order to further address listed impairments. Updated HEP to reflect today's session. All questions answered.    Patient's response to session: No change in pain, Increased ROM/joint mobility, Increase motor control, and Increased knowledge and understanding    Plan:  Continue per POC.    Current Problem:   1. Right knee pain, unspecified chronicity        2. S/P right knee surgery            Subjective   Patient reports she is doing well overall. Reports she is concerned about the incision on the posterior leg and unsure if it is healing properly. States mild discomfort with knee flexion. Completely HEP independently. She is 2.4 weeks s/p R knee synovectomy on 3/28/2025.    Pain Assessment: 0-10  0-10 (Numeric) Pain Score: 2    Precautions  Precautions Comment: right knee synovectomy on 3/28/2025    Objective   Knee ROM (L/R)  Extension: 0°/0°  Flexion: 131°/NT° - not tested  "today     Joint mobility testing (normal unless otherwise noted below)  Proximal Tibia-Fibular Joint:   PFJ: painful on right in all directions, mildly limited     Gait: antalgic, decreased stance on RLE    Treatments:  Therapeutic Exercise (58547): 23 minutes  HEP review  Upright bike x 5 min  SLR  Bridges  Bridges with march  Lateral step up, 6\"  Step up, 6\"  PB squat    Manual Therapy (85881):  0 minutes  Not performed    Neuromuscular Re-education (97371):  0 minutes  Not performed    Education and discussion on HEP and treatment regarding the benefits related to current condition, POC, pathophysiology, and precautions    *added to HEP    Care provided under direct supervision of Esdras De Paz, PT, DPT, OCS, CSCS  "

## 2025-04-18 ENCOUNTER — APPOINTMENT (OUTPATIENT)
Dept: PHYSICAL THERAPY | Facility: CLINIC | Age: 60
End: 2025-04-18
Payer: COMMERCIAL

## 2025-04-18 DIAGNOSIS — Z98.890 S/P RIGHT KNEE SURGERY: ICD-10-CM

## 2025-04-18 DIAGNOSIS — M25.561 RIGHT KNEE PAIN, UNSPECIFIED CHRONICITY: Primary | ICD-10-CM

## 2025-04-21 ENCOUNTER — APPOINTMENT (OUTPATIENT)
Dept: PHYSICAL THERAPY | Facility: CLINIC | Age: 60
End: 2025-04-21
Payer: COMMERCIAL

## 2025-04-21 DIAGNOSIS — Z98.890 S/P RIGHT KNEE SURGERY: ICD-10-CM

## 2025-04-21 DIAGNOSIS — M25.561 RIGHT KNEE PAIN, UNSPECIFIED CHRONICITY: Primary | ICD-10-CM

## 2025-04-25 ENCOUNTER — TREATMENT (OUTPATIENT)
Dept: PHYSICAL THERAPY | Facility: CLINIC | Age: 60
End: 2025-04-25
Payer: COMMERCIAL

## 2025-04-25 DIAGNOSIS — M25.561 RIGHT KNEE PAIN, UNSPECIFIED CHRONICITY: Primary | ICD-10-CM

## 2025-04-25 DIAGNOSIS — Z98.890 S/P RIGHT KNEE SURGERY: ICD-10-CM

## 2025-04-25 PROCEDURE — 97110 THERAPEUTIC EXERCISES: CPT | Mod: GP | Performed by: PHYSICAL THERAPIST

## 2025-04-25 ASSESSMENT — PAIN - FUNCTIONAL ASSESSMENT: PAIN_FUNCTIONAL_ASSESSMENT: 0-10

## 2025-04-25 ASSESSMENT — PAIN SCALES - GENERAL: PAINLEVEL_OUTOF10: 0 - NO PAIN

## 2025-04-25 NOTE — PROGRESS NOTES
Physical Therapy Treatment      Patient Name: Tere Woods  MRN: [id number]  Today's Date: 4/25/2025  Visit #4 (8 of 20)  Time Calculation  Start Time: 0805  Stop Time: 0837  Time Calculation (min): 32 min    Insurance:  Visit Limit: 20   Date Range: 7/30/2025  Co-Pay: None    Assessment:  Progressed ROM and strength training without issues today. Today's session focused on strength, ROM, neuromuscular control, endurance, joint mobility, soft tissue mobilization, and flexibility. Patient demonstrated good tolerance to session this date. They are demonstrating good progress in skilled rehabilitation at this time, though they are still limited by decreased muscle performance, decreased ROM, decreased activity tolerance, pain, participation restrictions, impaired balance/gait, and difficulty with ADL completion. Patient continues to be a good candidate for skilled PT in order to further address listed impairments. Updated HEP to reflect today's session. All questions answered.    Patient's response to session: No change in pain, Increased ROM/joint mobility, Increase motor control, and Increased knowledge and understanding    Plan:  Continue per POC.    Current Problem:   1. Right knee pain, unspecified chronicity  Follow Up In Physical Therapy      2. S/P right knee surgery            Subjective   Patient reports knee is doing well. Followed up with surgical team reports she is right on track and the wound is stable and non-concerning. She is getting back to full time work with some swelling by the end of the day. She is 4.0 weeks s/p R knee synovectomy on 3/28/2025.    Pain Assessment: 0-10  0-10 (Numeric) Pain Score: 0 - No pain    Precautions  Precautions Comment: right knee synovectomy on 3/28/2025    Objective   Knee ROM (L/R)  Extension: 0°/0°  Flexion: 128°/120°     Joint mobility testing (normal unless otherwise noted below)  Proximal Tibia-Fibular Joint:   PFJ:     Gait: mildly reduced  "TKE    Treatments:  Therapeutic Exercise (66802): 30 minutes  HEP review  Upright bike x 5 min  Prone quad stretch*  Bridge march  Step up with march, 8\"  Standing TKE*  Slant board  Heel raises*    Manual Therapy (07278):  0 minutes  Not performed    Neuromuscular Re-education (64942):  0 minutes  Not performed    Education and discussion on HEP and treatment regarding the benefits related to current condition, POC, pathophysiology, and precautions    *added to HEP  "

## 2025-05-12 ENCOUNTER — APPOINTMENT (OUTPATIENT)
Dept: PHYSICAL THERAPY | Facility: CLINIC | Age: 60
End: 2025-05-12
Payer: COMMERCIAL

## 2025-05-12 DIAGNOSIS — M25.561 RIGHT KNEE PAIN, UNSPECIFIED CHRONICITY: Primary | ICD-10-CM

## 2025-05-12 DIAGNOSIS — Z98.890 S/P RIGHT KNEE SURGERY: ICD-10-CM

## 2025-05-12 PROCEDURE — 97110 THERAPEUTIC EXERCISES: CPT | Mod: GP | Performed by: PHYSICAL THERAPIST

## 2025-05-12 ASSESSMENT — PAIN - FUNCTIONAL ASSESSMENT: PAIN_FUNCTIONAL_ASSESSMENT: 0-10

## 2025-05-12 ASSESSMENT — PAIN SCALES - GENERAL: PAINLEVEL_OUTOF10: 0 - NO PAIN

## 2025-05-14 NOTE — PROGRESS NOTES
Physical Therapy Treatment      Patient Name: Tere Woods  MRN: [id number]  Today's Date: 5/12/2025  Visit #5 (9 of 20)  Time Calculation  Start Time: 0850  Stop Time: 0920  Time Calculation (min): 30 min    Insurance:  Visit Limit: 20   Date Range: 7/30/2025  Co-Pay: None    Assessment:  Progressed loading without issues today. Today's session focused on strength, ROM, neuromuscular control, endurance, joint mobility, soft tissue mobilization, and flexibility. Patient demonstrated good tolerance to session this date. They are demonstrating good progress in skilled rehabilitation at this time, though they are still limited by decreased muscle performance, decreased ROM, decreased activity tolerance, pain, participation restrictions, impaired balance/gait, and difficulty with ADL completion. Patient continues to be a good candidate for skilled PT in order to further address listed impairments. Updated HEP to reflect today's session. All questions answered.    Patient's response to session: No change in pain, Increased ROM/joint mobility, Increase motor control, and Increased knowledge and understanding    Plan:  Continue per POC.    Current Problem:   1. Right knee pain, unspecified chronicity        2. S/P right knee surgery            Subjective   Patient reports she continues to do well. Knee has a bit of drainage still, but improving overall. She has been working on HEP and riding bike on her own. She is 6.3 weeks s/p R knee synovectomy on 3/28/2025.    Pain Assessment: 0-10  0-10 (Numeric) Pain Score: 0 - No pain    Precautions  Precautions Comment: right knee synovectomy on 3/28/2025    Objective   Knee ROM (L/R)  Extension: 0°/0°  Flexion: 132°/131°     Joint mobility testing (normal unless otherwise noted below)  Proximal Tibia-Fibular Joint:   PFJ:     Gait: unremarkable    Treatments:  Therapeutic Exercise (74172):  28 minutes  HEP review  Upright bike x 5 min  Stair negotiation  Lateral step down,  "6\"  Step up, 8\"  SLS  BW squat*    Manual Therapy (41385):  0 minutes  Not performed    Neuromuscular Re-education (82325):  0 minutes  Not performed    Education and discussion on HEP and treatment regarding the benefits related to current condition, POC, pathophysiology, and precautions    *added to HEP  "

## 2025-06-09 ENCOUNTER — APPOINTMENT (OUTPATIENT)
Dept: PHYSICAL THERAPY | Facility: CLINIC | Age: 60
End: 2025-06-09
Payer: COMMERCIAL

## 2025-06-09 DIAGNOSIS — M25.561 RIGHT KNEE PAIN, UNSPECIFIED CHRONICITY: Primary | ICD-10-CM

## 2025-06-09 DIAGNOSIS — Z98.890 S/P RIGHT KNEE SURGERY: ICD-10-CM

## 2025-06-09 PROCEDURE — 97110 THERAPEUTIC EXERCISES: CPT | Mod: GP | Performed by: PHYSICAL THERAPIST

## 2025-06-09 ASSESSMENT — PAIN - FUNCTIONAL ASSESSMENT: PAIN_FUNCTIONAL_ASSESSMENT: 0-10

## 2025-06-09 ASSESSMENT — PAIN SCALES - GENERAL: PAINLEVEL_OUTOF10: 0 - NO PAIN

## 2025-06-09 NOTE — PROGRESS NOTES
Physical Therapy Treatment      Patient Name: Tere Woods  MRN: [id number]  Today's Date: 6/9/2025  Visit #6 (10 of 20)  Time Calculation  Start Time: 0919  Stop Time: 0945  Time Calculation (min): 26 min    Insurance:  Visit Limit: 20   Date Range: 7/30/2025  Co-Pay: None    Assessment:  Eccentric loading tolerated well. Today's session focused on strength, ROM, neuromuscular control, endurance, joint mobility, and flexibility. Patient demonstrated good tolerance to session this date. They are demonstrating good progress in skilled rehabilitation at this time, though they are still limited by decreased muscle performance, decreased activity tolerance, pain, participation restrictions, and difficulty with ADL completion. Patient continues to be a good candidate for skilled PT in order to further address listed impairments. Updated HEP to reflect today's session. All questions answered.    Patient's response to session: No change in pain, Increased ROM/joint mobility, Increase motor control, and Increased knowledge and understanding    Plan:  Continue per POC.    Current Problem:   1. Right knee pain, unspecified chronicity        2. S/P right knee surgery            Subjective   Patient reports she is doing well overall. She is having trouble with eccentric loading of knee, including walking down steps and hills. She has been working on HEP and riding bike on her own. She is 10.3 weeks s/p R knee synovectomy on 3/28/2025.    Pain Assessment: 0-10  0-10 (Numeric) Pain Score: 0 - No pain    Precautions  Precautions Comment: right knee synovectomy on 3/28/2025    Objective   Knee ROM (L/R)  Extension: 0°/0°  Flexion: 135°/134°     Joint mobility testing (normal unless otherwise noted below)  Proximal Tibia-Fibular Joint:   PFJ:     Gait: unremarkable    Treatments:  Therapeutic Exercise (97953): 24 minutes  HEP review  Upright bike x 3.5 min  Prone quad stretch  Forward lunge - d/c'd  Reverse lunge*  PB HS  "bridge  Wall squat iso, 10\"*  Heel raises*    Manual Therapy (12542):  0 minutes  Not performed    Neuromuscular Re-education (21613):  0 minutes  Not performed    Education and discussion on HEP and treatment regarding the benefits related to current condition, POC, pathophysiology, and precautions    *added to HEP  "

## 2025-07-14 ENCOUNTER — APPOINTMENT (OUTPATIENT)
Dept: PHYSICAL THERAPY | Facility: CLINIC | Age: 60
End: 2025-07-14
Payer: COMMERCIAL

## 2025-07-25 ENCOUNTER — APPOINTMENT (OUTPATIENT)
Dept: PHYSICAL THERAPY | Facility: CLINIC | Age: 60
End: 2025-07-25
Payer: COMMERCIAL

## 2025-08-08 ENCOUNTER — APPOINTMENT (OUTPATIENT)
Dept: PHYSICAL THERAPY | Facility: CLINIC | Age: 60
End: 2025-08-08
Payer: COMMERCIAL

## 2025-08-08 DIAGNOSIS — Z98.890 S/P RIGHT KNEE SURGERY: ICD-10-CM

## 2025-08-08 DIAGNOSIS — M25.561 RIGHT KNEE PAIN, UNSPECIFIED CHRONICITY: Primary | ICD-10-CM

## 2025-08-08 PROCEDURE — 97110 THERAPEUTIC EXERCISES: CPT | Mod: GP | Performed by: PHYSICAL THERAPIST

## 2025-08-08 ASSESSMENT — PAIN - FUNCTIONAL ASSESSMENT: PAIN_FUNCTIONAL_ASSESSMENT: 0-10

## 2025-08-08 ASSESSMENT — PAIN SCALES - GENERAL: PAINLEVEL_OUTOF10: 0 - NO PAIN

## 2025-08-08 NOTE — PROGRESS NOTES
Physical Therapy Treatment and Reassessment      Patient Name: Tere Woods  MRN: [id number]  Today's Date: 8/8/2025  Visit #7 (10 of 20)  Time Calculation  Start Time: 1025  Stop Time: 1057  Time Calculation (min): 32 min    Insurance:  Visit Limit: 20   Date Range: 7/30/2025  Co-Pay: None    Assessment:  Patient doing well overall though continuing to show reduced muscle performance and activity tolerance. Today's session focused on strength, neuromuscular control, and endurance. Patient demonstrated good tolerance to session this date. They are demonstrating good progress in skilled rehabilitation at this time, though they are still limited by decreased muscle performance, decreased activity tolerance, pain, participation restrictions, and difficulty with ADL completion. Patient continues to be a good candidate for skilled PT in order to further address listed impairments. Updated HEP to reflect today's session. All questions answered.    Patient's response to session: No change in pain, Increase motor control, and Increased knowledge and understanding    Plan:  Continue per POC.    Current Problem:   1. Right knee pain, unspecified chronicity        2. S/P right knee surgery            Subjective   Patient reports she is doing well overall. She feels she has trouble squatting down, kneeling, descending stairs, and standing for a prolonged time. She has been riding bike and walking. She is 4.5 months s/p R knee synovectomy on 3/28/2025.    Pain Assessment: 0-10  0-10 (Numeric) Pain Score: 0 - No pain    Precautions  Precautions Comment: right knee synovectomy on 3/28/2025    Objective   Knee ROM (L/R)  Extension: 0°/0°  Flexion: 135°/134°     Joint mobility testing (normal unless otherwise noted below)  Proximal Tibia-Fibular Joint:   PFJ:     Gait: unremarkable    Unable to squat down without UE support    Difficulty with ant step down    LEFS: 55    Treatments:  Therapeutic Exercise (33778): 30 minutes  HEP  "review  Reassessment  Wall squat iso, 10\"*  Ant step down, 6\"*  Bridge march*  Side steps, green TB*  Education on POC and HEP    Manual Therapy (61502):  0 minutes  Not performed    Neuromuscular Re-education (62673):  0 minutes  Not performed    Education and discussion on HEP and treatment regarding the benefits related to current condition, POC, pathophysiology, and precautions    *added to HEP    Goals:   Patient will improve Lower Extremity Functional Scale score to meet minimal detectable change of improvement to improve performance of ADLs. MET     Patient will be independent with home exercise program for proper self-management of condition. MET     Patient will improve active range of motion in deficit areas for ADL completion. MET     Patient will improve strength in deficit areas so patient can work with less pain. PROGRESSING     Patient will return to work without pain. PROGRESSING  "

## 2025-12-15 ENCOUNTER — APPOINTMENT (OUTPATIENT)
Dept: PRIMARY CARE | Facility: CLINIC | Age: 60
End: 2025-12-15
Payer: COMMERCIAL